# Patient Record
Sex: MALE | Race: WHITE | NOT HISPANIC OR LATINO | Employment: FULL TIME | URBAN - METROPOLITAN AREA
[De-identification: names, ages, dates, MRNs, and addresses within clinical notes are randomized per-mention and may not be internally consistent; named-entity substitution may affect disease eponyms.]

---

## 2017-02-15 ENCOUNTER — OFFICE VISIT (OUTPATIENT)
Dept: FAMILY MEDICINE | Facility: CLINIC | Age: 27
End: 2017-02-15
Payer: COMMERCIAL

## 2017-02-15 VITALS
SYSTOLIC BLOOD PRESSURE: 138 MMHG | OXYGEN SATURATION: 98 % | HEART RATE: 72 BPM | WEIGHT: 315 LBS | RESPIRATION RATE: 20 BRPM | DIASTOLIC BLOOD PRESSURE: 90 MMHG | TEMPERATURE: 97.6 F | BODY MASS INDEX: 40.62 KG/M2

## 2017-02-15 DIAGNOSIS — M79.5 FOREIGN BODY (FB) IN SOFT TISSUE: ICD-10-CM

## 2017-02-15 DIAGNOSIS — F17.200 TOBACCO USE DISORDER: Primary | ICD-10-CM

## 2017-02-15 PROCEDURE — 99214 OFFICE O/P EST MOD 30 MIN: CPT | Performed by: FAMILY MEDICINE

## 2017-02-15 RX ORDER — VARENICLINE TARTRATE 1 MG/1
1 TABLET, FILM COATED ORAL 2 TIMES DAILY
Qty: 56 TABLET | Refills: 2 | Status: SHIPPED | OUTPATIENT
Start: 2017-02-15 | End: 2017-05-01

## 2017-02-15 ASSESSMENT — PAIN SCALES - GENERAL: PAINLEVEL: SEVERE PAIN (7)

## 2017-02-15 NOTE — PROGRESS NOTES
SUBJECTIVE:                                                    Maco Chen is a 26 year old male who presents to clinic today for the following health issues:      Chief Complaint   Patient presents with     Injury     Has a sliver in his left palm, states that it has puss coming out of it.  States that it is painful and throbbs.       Healthy young man comes in with a potential sliver for the past week. Getting worse. Has purulent discharge. No fevers. Minimal redness.    We also had a good discussion about smoking as this patient cessation. He has been a pack per day. History of patches and gum. He would like to try Chantix next. After discussed the options.      Problem list and histories reviewed & adjusted, as indicated.  Additional history: as documented        ROS:      OBJECTIVE:                                                    /90 (BP Location: Left arm, Patient Position: Chair, Cuff Size: Adult Regular)  Pulse 72  Temp 97.6  F (36.4  C) (Temporal)  Resp 20  Wt (!) 316 lb 6.4 oz (143.5 kg)  SpO2 98%  BMI 40.62 kg/m2  Body mass index is 40.62 kg/(m^2).  Well-appearing. On his left hand on the palmar aspect near the MCP joint there is a puncture wound with a small amount of drainage present. I anesthetized the area with 3 mL's 1% lidocaine with epinephrine. With good anesthesia and then did a shallow stab incision. I removed a 1 cm splinter. Mild amount of bleeding. Sterile dressing applied.         ASSESSMENT/PLAN:                                                              ICD-10-CM    1. Tobacco use disorder F17.200 varenicline (CHANTIX STARTING MONTH BIANCA) 0.5 MG X 11 & 1 MG X 42 tablet     varenicline (CHANTIX) 1 MG tablet   2. Foreign body (FB) in soft tissue M79.5      Foreign body removed without difficulty. After discussion of his options, like to try Chantix. Discussed the risks benefits and possible side effects associated with medication use. Also discussed nonpharmacological  ways to address his dependence. See him back in one month for recheck. He agrees.      Immanuel Andrew MD  [facility]

## 2017-02-15 NOTE — MR AVS SNAPSHOT
"              After Visit Summary   2/15/2017    Maco Chen    MRN: [id number]           Patient Information     Date Of Birth          1990        Visit Information        Provider Department      2/15/2017 9:20 AM Immanuel Andrew MD [facility]        Today's Diagnoses     Tobacco use disorder    -  1    Foreign body (FB) in soft tissue           Follow-ups after your visit        Who to contact     If you have questions or need follow up information about today's clinic visit or your schedule please contact [facility] directly at [phone].  Normal or non-critical lab and imaging results will be communicated to you by [employer], letter or phone within 4 business days after the clinic has received the results. If you do not hear from us within 7 days, please contact the clinic through [employer] or phone. If you have a critical or abnormal lab result, we will notify you by phone as soon as possible.  Submit refill requests through [employer] or call your pharmacy and they will forward the refill request to us. Please allow 3 business days for your refill to be completed.          Additional Information About Your Visit        MyChart Information     [employer] lets you send messages to your doctor, view your test results, renew your prescriptions, schedule appointments and more. To sign up, go to [email or web address] . Click on \"Log in\" on the left side of the screen, which will take you to the Welcome page. Then click on \"Sign up Now\" on the right side of the page.     You will be asked to enter the access code listed below, as well as some personal information. Please follow the directions to create your username and password.     Your access code is: [email or web address]  Expires: 2017  5:13 PM     Your access code will  in 90 days. If you need help or a new code, please call your [facility] or [phone].        Care EveryWhere ID     This is your " Care EveryWhere ID. This could be used by other organizations to access your [facility] medical records  [id number]        Your Vitals Were     Pulse Temperature Respirations Pulse Oximetry BMI (Body Mass Index)       72 97.6  F (36.4  C) (Temporal) 20 98% 40.62 kg/m2        Blood Pressure from Last 3 Encounters:   02/15/17 138/90   11/04/16 130/80   10/03/16 (!) 165/109    Weight from Last 3 Encounters:   02/15/17 (!) 316 lb 6.4 oz (143.5 kg)   11/04/16 (!) 314 lb (142.4 kg)   10/03/16 290 lb (131.5 kg)              Today, you had the following     No orders found for display         Today's Medication Changes          These changes are accurate as of: 2/15/17  5:13 PM.  If you have any questions, ask your nurse or doctor.               Start taking these medicines.        Dose/Directions    * varenicline 0.5 MG X 11 & 1 MG X 42 tablet   Commonly known as:  CHANTIX STARTING MONTH BIANCA   Used for:  Tobacco use disorder   Started by:  Immanuel Andrew MD        Take 0.5 mg tab daily for 3 days, then 0.5 mg tab twice daily for 4 days, then 1 mg twice daily.   Quantity:  53 tablet   Refills:  0       * varenicline 1 MG tablet   Commonly known as:  CHANTIX   Used for:  Tobacco use disorder   Started by:  Immanuel Andrew MD        Dose:  1 mg   Take 1 tablet (1 mg) by mouth 2 times daily   Quantity:  56 tablet   Refills:  2       * Notice:  This list has 2 medication(s) that are the same as other medications prescribed for you. Read the directions carefully, and ask your doctor or other care provider to review them with you.         Where to get your medicines      These medications were sent to [facility] [place], [place] - [address]   [address] [facility] [address]     Phone:  [phone]     varenicline 0.5 MG X 11 & 1 MG X 42 tablet    varenicline 1 MG tablet                Primary Care Provider    None Doctor, MD       No address on file        Thank you!     Thank you for choosing  [facility]  for your care. Our goal is always to provide you with excellent care. Hearing back from our patients is one way we can continue to improve our services. Please take a few minutes to complete the written survey that you may receive in the mail after your visit with us. Thank you!             Your Updated Medication List - Protect others around you: Learn how to safely use, store and throw away your medicines at www.disposemymeds.org.          This list is accurate as of: 2/15/17  5:13 PM.  Always use your most recent med list.                   Brand Name Dispense Instructions for use    order for DME     1 Units    Equipment being ordered: right wrist splint of appropriate size       * varenicline 0.5 MG X 11 & 1 MG X 42 tablet    CHANTIX STARTING MONTH BIANCA    53 tablet    Take 0.5 mg tab daily for 3 days, then 0.5 mg tab twice daily for 4 days, then 1 mg twice daily.       * varenicline 1 MG tablet    CHANTIX    56 tablet    Take 1 tablet (1 mg) by mouth 2 times daily       * Notice:  This list has 2 medication(s) that are the same as other medications prescribed for you. Read the directions carefully, and ask your doctor or other care provider to review them with you.

## 2017-02-15 NOTE — NURSING NOTE
"Chief Complaint   Patient presents with     Injury     Has a sliver in his left palm, states that it has puss coming out of it.  States that it is painful and throbbs.         Initial /90 (BP Location: Left arm, Patient Position: Chair, Cuff Size: Adult Regular)  Pulse 72  Temp 97.6  F (36.4  C) (Temporal)  Resp 20  Wt (!) 316 lb 6.4 oz (143.5 kg)  SpO2 98%  BMI 40.62 kg/m2 Estimated body mass index is 40.62 kg/(m^2) as calculated from the following:    Height as of 5/23/16: 6' 2\" (1.88 m).    Weight as of this encounter: 316 lb 6.4 oz (143.5 kg).  Medication Reconciliation: complete   Roxanne Barnes, CMA     "

## 2017-05-01 ENCOUNTER — OFFICE VISIT (OUTPATIENT)
Dept: FAMILY MEDICINE | Facility: CLINIC | Age: 27
End: 2017-05-01
Payer: COMMERCIAL

## 2017-05-01 VITALS
WEIGHT: 315 LBS | TEMPERATURE: 98.1 F | SYSTOLIC BLOOD PRESSURE: 134 MMHG | OXYGEN SATURATION: 95 % | BODY MASS INDEX: 40.96 KG/M2 | RESPIRATION RATE: 16 BRPM | HEART RATE: 103 BPM | DIASTOLIC BLOOD PRESSURE: 74 MMHG

## 2017-05-01 DIAGNOSIS — J06.9 UPPER RESPIRATORY TRACT INFECTION, UNSPECIFIED TYPE: Primary | ICD-10-CM

## 2017-05-01 LAB
DEPRECATED S PYO AG THROAT QL EIA: NORMAL
FLUAV+FLUBV AG SPEC QL: NEGATIVE
FLUAV+FLUBV AG SPEC QL: NORMAL
MICRO REPORT STATUS: NORMAL
SPECIMEN SOURCE: NORMAL
SPECIMEN SOURCE: NORMAL

## 2017-05-01 PROCEDURE — 87804 INFLUENZA ASSAY W/OPTIC: CPT | Performed by: FAMILY MEDICINE

## 2017-05-01 PROCEDURE — 99213 OFFICE O/P EST LOW 20 MIN: CPT | Performed by: FAMILY MEDICINE

## 2017-05-01 PROCEDURE — 87081 CULTURE SCREEN ONLY: CPT | Performed by: FAMILY MEDICINE

## 2017-05-01 PROCEDURE — 87880 STREP A ASSAY W/OPTIC: CPT | Performed by: FAMILY MEDICINE

## 2017-05-01 RX ORDER — CETIRIZINE HYDROCHLORIDE 10 MG/1
10 TABLET ORAL EVERY EVENING
Qty: 30 TABLET | Refills: 1 | Status: SHIPPED | OUTPATIENT
Start: 2017-05-01 | End: 2017-07-10

## 2017-05-01 RX ORDER — FLUTICASONE PROPIONATE 50 MCG
2 SPRAY, SUSPENSION (ML) NASAL DAILY
Qty: 1 G | Refills: 6 | Status: SHIPPED | OUTPATIENT
Start: 2017-05-01 | End: 2017-07-10

## 2017-05-01 ASSESSMENT — PAIN SCALES - GENERAL: PAINLEVEL: MODERATE PAIN (5)

## 2017-05-01 NOTE — NURSING NOTE
"Chief Complaint   Patient presents with     Sinus Problem     1 week now     Cough       Initial /74  Pulse 103  Temp 98.1  F (36.7  C) (Tympanic)  Resp 16  Wt (!) 319 lb (144.7 kg)  SpO2 95%  BMI 40.96 kg/m2 Estimated body mass index is 40.96 kg/(m^2) as calculated from the following:    Height as of 5/23/16: 6' 2\" (1.88 m).    Weight as of this encounter: 319 lb (144.7 kg).  Medication Reconciliation: complete    "

## 2017-05-01 NOTE — PROGRESS NOTES
SUBJECTIVE:                                                    Maco Chen is a 26 year old male who presents to clinic today for the following health issues:      Acute Illness   Acute illness concerns:   Onset: 1 week now    Fever: no    Chills/Sweats: no     Headache (location?): YES    Sinus Pressure:YES    Conjunctivitis:  no    Ear Pain: YES: left    Rhinorrhea: YES    Congestion: YES    Sore Throat: yes - started this am      Cough: YES-productive of green sputum    Wheeze: no     Decreased Appetite: no     Nausea: no    Vomiting: no    Diarrhea:  no    Dysuria/Freq.: no    Fatigue/Achiness: YES    Sick/Strep Exposure: no     Therapies Tried and outcome: Rest, Sudafed    No fever.  HA behind the eyes to ears.  No sick exposure.  No exposure to measles.  No rash.  Couple days for watery eyes and mattering in the morning.  Been sneezing.  No neck stiffness. Drinking ok, low appetite.  No N/V.  Healthy - received two doses of MMR.        Problem list and histories reviewed & adjusted, as indicated.  Additional history: as documented    No current outpatient prescriptions on file.     Allergies   Allergen Reactions     Tessalon [Benzonatate] Itching, Swelling and Rash     Red flushed face, hives to face, ears and jaw area very swollen       Zithromax [Azithromycin Dihydrate] Itching, Swelling and Rash     Red flushed face, hives to face, ears and jaw area very swollen       Reviewed and updated as needed this visit by clinical staff  Tobacco  Meds       Reviewed and updated as needed this visit by Provider         ROS:  Constitutional, HEENT, cardiovascular, pulmonary, gi and gu systems are negative, except as otherwise noted.    OBJECTIVE:                                                    /74  Pulse 103  Temp 98.1  F (36.7  C) (Tympanic)  Resp 16  Wt (!) 319 lb (144.7 kg)  SpO2 95%  BMI 40.96 kg/m2  Body mass index is 40.96 kg/(m^2).   GENERAL: healthy, alert and no distress.  Speak  in full sentences.  Not looking acutely sick  HENT: ear canals and TM's normal.  Nares are congested with clear drainage.  Oropharynx is pink and moist.  No tonsilar redness or hypertrophy but white exudate was noted on the uvula.  No tender with palpation to the sinuses.  Eyes with no conjunctivital injection or erythema.  No mattering.   NECK: no adenopathy.  RESP: lungs clear to auscultation - no rales, rhonchi or wheezes  CV: regular rate and rhythm, no murmur.  ABDOMEN: soft, non-tender and bowel sounds normal    Diagnostic Test Results:  Results for orders placed or performed in visit on 05/01/17 (from the past 24 hour(s))   Strep, Rapid Screen   Result Value Ref Range    Specimen Description Throat     Rapid Strep A Screen       NEGATIVE: No Group A streptococcal antigen detected by immunoassay, await   culture report.      Micro Report Status FINAL 05/01/2017    Influenza A/B antigen   Result Value Ref Range    Influenza A/B Agn Specimen Nasopharyngeal     Influenza A Negative NEG    Influenza B  NEG     Negative   Test results must be correlated with clinical data. If necessary, results   should be confirmed by a molecular assay or viral culture.          ASSESSMENT/PLAN:                                                        ICD-10-CM    1. Upper respiratory tract infection, unspecified type J06.9 Strep, Rapid Screen     Influenza A/B antigen     Beta strep group A culture     cetirizine (ZYRTEC) 10 MG tablet     fluticasone (FLONASE) 50 MCG/ACT spray     CANCELED: Influenza A and B and RSV PCR     Discussed with him about the nature of the condition.  Inform him that it is most likely is viral in nature and therefore antibiotic would not be effective.  Encourage OTC medications as needed for symptomatic treatment.  Zyrtec and Flonase for nasal congestion.  Recommend to drink a lot of water and rest adequately.  Call in or follow up if not improve or worsening in the next 3-4 days.  ER if develops  breathing problem.      Lena Chung Mai, MD  [facility]

## 2017-05-01 NOTE — MR AVS SNAPSHOT
"              After Visit Summary   2017    Maco Chen    MRN: [id number]           Patient Information     Date Of Birth          1990        Visit Information        Provider Department      2017 2:00 PM Lena Coleman MD [facility]        Today's Diagnoses     Upper respiratory tract infection, unspecified type    -  1       Follow-ups after your visit        Follow-up notes from your care team     Return if symptoms worsen or fail to improve.      Who to contact     If you have questions or need follow up information about today's clinic visit or your schedule please contact [facility] directly at [phone].  Normal or non-critical lab and imaging results will be communicated to you by MyChart, letter or phone within 4 business days after the clinic has received the results. If you do not hear from us within 7 days, please contact the clinic through MyChart or phone. If you have a critical or abnormal lab result, we will notify you by phone as soon as possible.  Submit refill requests through [employer] or call your pharmacy and they will forward the refill request to us. Please allow 3 business days for your refill to be completed.          Additional Information About Your Visit        MyChart Information     [employer] lets you send messages to your doctor, view your test results, renew your prescriptions, schedule appointments and more. To sign up, go to [email or web address] . Click on \"Log in\" on the left side of the screen, which will take you to the Welcome page. Then click on \"Sign up Now\" on the right side of the page.     You will be asked to enter the access code listed below, as well as some personal information. Please follow the directions to create your username and password.     Your access code is: [email or web address]  Expires: 2017  6:13 PM     Your access code will  in 90 days. If you need help or a new code, please call your " [facility] or [phone].        Care EveryWhere ID     This is your Care EveryWhere ID. This could be used by other organizations to access your [place] medical records  [id number]        Your Vitals Were     Pulse Temperature Respirations Pulse Oximetry BMI (Body Mass Index)       103 98.1  F (36.7  C) (Tympanic) 16 95% 40.96 kg/m2        Blood Pressure from Last 3 Encounters:   05/01/17 134/74   02/15/17 138/90   11/04/16 130/80    Weight from Last 3 Encounters:   05/01/17 (!) 319 lb (144.7 kg)   02/15/17 (!) 316 lb 6.4 oz (143.5 kg)   11/04/16 (!) 314 lb (142.4 kg)              We Performed the Following     Beta strep group A culture     Influenza A/B antigen     Strep, Rapid Screen          Today's Medication Changes          These changes are accurate as of: 5/1/17  4:32 PM.  If you have any questions, ask your nurse or doctor.               Start taking these medicines.        Dose/Directions    cetirizine 10 MG tablet   Commonly known as:  zyrTEC   Used for:  Upper respiratory tract infection, unspecified type   Started by:  Lena Coleman MD        Dose:  10 mg   Take 1 tablet (10 mg) by mouth every evening   Quantity:  30 tablet   Refills:  1       fluticasone 50 MCG/ACT spray   Commonly known as:  FLONASE   Used for:  Upper respiratory tract infection, unspecified type   Started by:  Lena Coleman MD        Dose:  2 spray   Spray 2 sprays into both nostrils daily   Quantity:  1 g   Refills:  6            Where to get your medicines      These medications were sent to [facility] [place], [place] - [address]   [address] [facility] [address]     Phone:  [phone]     cetirizine 10 MG tablet    fluticasone 50 MCG/ACT spray                Primary Care Provider    None Doctor, MD       No address on file        Thank you!     Thank you for choosing [facility]  for your care. Our goal is always to provide you with excellent care. Hearing back from our patients is one  way we can continue to improve our services. Please take a few minutes to complete the written survey that you may receive in the mail after your visit with us. Thank you!             Your Updated Medication List - Protect others around you: Learn how to safely use, store and throw away your medicines at www.disposemymeds.org.          This list is accurate as of: 5/1/17  4:32 PM.  Always use your most recent med list.                   Brand Name Dispense Instructions for use    cetirizine 10 MG tablet    zyrTEC    30 tablet    Take 1 tablet (10 mg) by mouth every evening       fluticasone 50 MCG/ACT spray    FLONASE    1 g    Spray 2 sprays into both nostrils daily

## 2017-05-01 NOTE — LETTER
[address]   [address]  Tel. [phone] / Fax [phone]          Regarding:  Simonchhaya SAMANTA Chen  [address] [place] [facility] [address]        5/1/2017      To whom it may concern;    Maco was seen on clinic today. He needs to be out of work from 5/1/2017-5/3/2017. If you have any questions or concerns, Please call my office at [phone].        Sincerely,

## 2017-05-02 ENCOUNTER — TELEPHONE (OUTPATIENT)
Dept: FAMILY MEDICINE | Facility: CLINIC | Age: 27
End: 2017-05-02

## 2017-05-02 NOTE — TELEPHONE ENCOUNTER
Patient called back and info was given.  Thank you,  Annamaria Aden   for [facility]

## 2017-05-02 NOTE — TELEPHONE ENCOUNTER
----- Message from Lena Chung Mai, MD sent at 5/1/2017  5:59 PM CDT -----  Please let patient know that his test for influenza were also negative.

## 2017-05-03 LAB
BACTERIA SPEC CULT: NORMAL
MICRO REPORT STATUS: NORMAL
SPECIMEN SOURCE: NORMAL

## 2017-07-10 ENCOUNTER — APPOINTMENT (OUTPATIENT)
Dept: GENERAL RADIOLOGY | Facility: CLINIC | Age: 27
End: 2017-07-10
Attending: PHYSICIAN ASSISTANT
Payer: COMMERCIAL

## 2017-07-10 ENCOUNTER — HOSPITAL ENCOUNTER (EMERGENCY)
Facility: CLINIC | Age: 27
Discharge: HOME | End: 2017-07-10
Attending: PHYSICIAN ASSISTANT | Admitting: PHYSICIAN ASSISTANT
Payer: COMMERCIAL

## 2017-07-10 VITALS
HEIGHT: 74 IN | BODY MASS INDEX: 40.43 KG/M2 | RESPIRATION RATE: 16 BRPM | HEART RATE: 76 BPM | TEMPERATURE: 98 F | WEIGHT: 315 LBS | SYSTOLIC BLOOD PRESSURE: 159 MMHG | OXYGEN SATURATION: 99 % | DIASTOLIC BLOOD PRESSURE: 96 MMHG
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
## 2017-07-10 DIAGNOSIS — S61.312A LACERATION OF RIGHT MIDDLE FINGER WITHOUT FOREIGN BODY WITH DAMAGE TO NAIL, INITIAL ENCOUNTER: ICD-10-CM

## 2017-07-10 DIAGNOSIS — S60.131A CONTUSION OF RIGHT MIDDLE FINGER WITH DAMAGE TO NAIL, INITIAL ENCOUNTER: ICD-10-CM

## 2017-07-10 PROCEDURE — 12002 RPR S/N/AX/GEN/TRNK2.6-7.5CM: CPT | Mod: Z6 | Performed by: PHYSICIAN ASSISTANT

## 2017-07-10 PROCEDURE — 12002 RPR S/N/AX/GEN/TRNK2.6-7.5CM: CPT | Performed by: PHYSICIAN ASSISTANT

## 2017-07-10 PROCEDURE — 73140 X-RAY EXAM OF FINGER(S): CPT | Mod: TC,RT

## 2017-07-10 PROCEDURE — 99282 EMERGENCY DEPT VISIT SF MDM: CPT | Mod: Z6 | Performed by: PHYSICIAN ASSISTANT

## 2017-07-10 PROCEDURE — 99283 EMERGENCY DEPT VISIT LOW MDM: CPT | Performed by: PHYSICIAN ASSISTANT

## 2017-07-10 RX ORDER — BUPIVACAINE HYDROCHLORIDE 2.5 MG/ML
10 INJECTION, SOLUTION EPIDURAL; INFILTRATION; INTRACAUDAL ONCE
Status: DISCONTINUED | OUTPATIENT
Start: 2017-07-10 | End: 2017-07-10 | Stop reason: HOSPADM

## 2017-07-10 NOTE — ED PROVIDER NOTES
"  History     Chief Complaint   Patient presents with     Trauma     Hand Pain     The history is provided by the patient.     Maco Chen is a 26 year old male who presents to the ED with complaints of right hand pain. The patient states that he dropped a 400 pound cabinet on his middle finger. He says that it did not begin to bleed until he started to wrap it up. He endorses that it feels like his finger is going to \"explode\". He reports having his last tetanus shot in 2014.    I have reviewed the Medications, Allergies, Past Medical and Surgical History, and Social History in the Epic system.    Patient Active Problem List   Diagnosis     Morbid obesity with body mass index of 40.0-44.9 in adult (H)     Disturbance in sleep behavior     Hypertrophy of tonsils alone     Oppositional defiant disorder     Tobacco use disorder     Knee Pain, effusion, right, trauma     Past Medical History:   Diagnosis Date     Hypertrophy of tonsils alone      Obesity, unspecified      Oppositional defiant disorder of childhood or adolescence      Sleep disturbance, unspecified      Past Surgical History:   Procedure Laterality Date     HC REMOVAL PREPATELLA BURSA  05/27/10     TONSILLECTOMY & ADENOIDECTOMY  3/21/2006     Family History   Problem Relation Age of Onset     CANCER Maternal Grandfather      throat cancer     Allergies Brother      Allergies Sister      Social History   Substance Use Topics     Smoking status: Current Every Day Smoker     Packs/day: 1.00     Years: 5.00     Types: Cigarettes     Smokeless tobacco: Never Used     Alcohol use 0.0 oz/week     0 Standard drinks or equivalent per week      Comment: rare      Immunization History   Administered Date(s) Administered     DPT 03/24/1993, 08/27/1993, 10/22/1993, 02/08/1994, 04/26/1994, 09/08/1998     HepB-Peds 07/29/1998, 08/31/2005, 12/05/2005     Influenza Vaccine IM 3yrs+ 4 Valent IIV4 09/09/2016     MMR 04/26/1994, 07/29/1998     OPV 03/24/1993, " "08/27/1993, 10/22/1993, 04/26/1994     TD (ADULT, 7+) 08/31/2005, 04/21/2014     Allergies   Allergen Reactions     Tessalon [Benzonatate] Itching, Swelling and Rash     Red flushed face, hives to face, ears and jaw area very swollen       Zithromax [Azithromycin Dihydrate] Itching, Swelling and Rash     Red flushed face, hives to face, ears and jaw area very swollen     No current outpatient prescriptions on file.     Review of Systems   Musculoskeletal:        Right middle finger pain    All other systems reviewed and are negative.    Physical Exam   BP: (!) 159/96  Pulse: 80  Temp: 98  F (36.7  C)  Resp: 18  Height: 188 cm (6' 2\")  Weight: (!) 145.2 kg (320 lb)  SpO2: 99 %  Physical Exam   Constitutional: He is oriented to person, place, and time. He appears well-developed and well-nourished.   HENT:   Head: Atraumatic.   Eyes: Conjunctivae and EOM are normal.   Neck: Normal range of motion. Neck supple.   Cardiovascular: Normal rate, regular rhythm and normal heart sounds.    Pulmonary/Chest: Effort normal and breath sounds normal.   Abdominal: Soft. Bowel sounds are normal.   Musculoskeletal:        Right hand: He exhibits tenderness and laceration. He exhibits normal range of motion.        Hands:  Right third finger with 2 lacerations. The ventral aspect of the distal phalanx has a 2 cm distal based flap laceration transversely across the fingers. Normal flexion and extension at the DIP. Normal strength. No tendon involvement. The tip of the finger along the distal nailbed there is a laceration measuring 2 cm. The skin in this location  from the distal nail plate area. It will require suturing to reapproximate. Sensation is intact to light touch.   Neurological: He is alert and oriented to person, place, and time.   Skin: Skin is warm and dry.   Psychiatric: He has a normal mood and affect. His behavior is normal.   Nursing note and vitals reviewed.    ED Course     ED Course     Laceration " "repair  Date/Time: 7/11/2017 2:42 PM  Performed by: DAMI MILLER  Authorized by: DAMI MILLER   Consent: Verbal consent obtained.  Risks and benefits: risks, benefits and alternatives were discussed  Consent given by: patient  Patient understanding: patient states understanding of the procedure being performed  Patient identity confirmed: verbally with patient and arm band  Time out: Immediately prior to procedure a \"time out\" was called to verify the correct patient, procedure, equipment, support staff and site/side marked as required.  Body area: upper extremity  Location details: right long finger  Laceration length: 2 (2 lacerations, each 2 cm long) cm  Foreign bodies: no foreign bodies  Tendon involvement: none  Nerve involvement: none  Vascular damage: no  Anesthesia: digital block    Anesthesia:  Anesthesia: digital block  Local Anesthetic: bupivacaine 0.25% without epinephrine   Anesthetic total: 6 mL  Irrigation solution: saline  Irrigation method: syringe  Amount of cleaning: standard  Debridement: none  Degree of undermining: none  Skin closure: 4-0 nylon  Number of sutures: 7 (3 on the distal finger laceration, 4 on the ventral distal finger laceration)  Technique: simple  Approximation: close  Approximation difficulty: simple  Dressing: antibiotic ointment and tube gauze  Patient tolerance: Patient tolerated the procedure well with no immediate complications                   Critical Care time:  none        Results for orders placed or performed during the hospital encounter of 07/10/17   XR Finger Right G/E 2 Views    Narrative    RIGHT FINGER TWO OR MORE VIEWS   7/10/2017 5:45 PM     HISTORY: Right distal middle finger injury.    COMPARISON: None.      Impression    IMPRESSION: No acute fracture or fracture or dislocation. Soft tissue  swelling adjacent to the tip of the right third digit.    RISA EUCEDA MD        Results for orders placed or performed during the hospital encounter of " 07/10/17 (from the past 24 hour(s))   XR Finger Right G/E 2 Views    Narrative    RIGHT FINGER TWO OR MORE VIEWS   7/10/2017 5:45 PM     HISTORY: Right distal middle finger injury.    COMPARISON: None.      Impression    IMPRESSION: No acute fracture or fracture or dislocation. Soft tissue  swelling adjacent to the tip of the right third digit.    RISA EUCEDA MD       Medications - No data to display  Assessments & Plan (with Medical Decision Making)     Laceration of right middle finger without foreign body with damage to nail, initial encounter  Contusion of right middle finger with damage to nail, initial encounter     26 year old male presents for evaluation of a right third distal finger injury. He was lifting a 400 pound cabinet when it fell directly onto his finger. He pulled his finger away when it landed on it. He noted bleeding right away and therefore he bandaged it and came into the ED for evaluation. She denies any change in his strength or range of motion ability. Sensation is intact to light touch. Tetanus status is up-to-date as it was given last in the previous 3 years. On exam he has a laceration on the ventral and distal aspect of the finger, both 2 cm in length. They both required suturing. 4 anesthesia we did provide a digital block. X-ray of the finger did not show any underlying fracture. 3 simple interrupted 4-0 Ethilon sutures were placed through the distal nail plate on the distal finger laceration to approximate it back. I then placed #4 Ethilon 4-0 sutures on the ventral aspect of the finger to approximate the flap laceration. The wound was dressed in bacitracin ointment and then tube gauze. He was provided in aluminum padded splint to protect the finger. We discussed elevating the finger as much as possible. Ibuprofen as needed. Change the wound dressing 2 times per day. Follow-up for possible suture removal in 1 week. The patient was in agreement with this plan and was suitable for  discharge.      I have reviewed the nursing notes.    I have reviewed the findings, diagnosis, plan and need for follow up with the patient.       There are no discharge medications for this patient.      Final diagnoses:   Laceration of right middle finger without foreign body with damage to nail, initial encounter   Contusion of right middle finger with damage to nail, initial encounter     This document serves as a record of services personally performed by Angel Franco PA-C. It was created on their behalf by Nicole Bowen, a trained medical scribe. The creation of this record is based on the provider's personal observations and the statements of the patient. This document has been checked and approved by the attending provider.    Note: Chart documentation done in part with Dragon Voice Recognition software. Although reviewed after completion, some word and grammatical errors may remain.    7/10/2017   Angel Franco PA-C   [facility] EMERGENCY DEPARTMENT     Angel Franco PA-C  07/11/17 1441       Angel Franco PA-C  07/11/17 1444

## 2017-07-10 NOTE — ED AVS SNAPSHOT
[facility]    [address] [place] [address]    Phone:  [phone]    Fax:  [phone]                                       Maco Chen   MRN: [id number]    Department:  [facility] Department   Date of Visit:  7/10/2017           Patient Information     Date Of Birth          1990        Your diagnoses for this visit were:     Laceration of right middle finger without foreign body with damage to nail, initial encounter     Contusion of right middle finger with damage to nail, initial encounter        You were seen by Angel Franco PA-C.      Follow-up Information     Follow up with Clinic, [facility]. Schedule an appointment as soon as possible for a visit in 1 week.    Why:  For suture removal    Contact information:    [phone]          Discharge Instructions       1.   Please change your finger dressing 2 times per day.  Use Bacitracin, gauze, and conforming bandage.    2.   Please keep the finger elevated as much as possible to help prevent pain and swelling.    3.   It is okay to use Ibuprofen 800 mg ( 4 tabs of the over the counter 200 mg tablets ) every 8 hours as needed for pain.          Extremity Laceration: Sutures, Staples, or Tape  A laceration is a cut through the skin. If it is deep, it may require stitches (sutures) or staples to close so it can heal. Minor cuts may be treated with surgical tape closures.   X-rays may be done if something may have entered the skin through the cut. You may also need a tetanus shot if you are not up to date on this vaccination.  Home care    Follow the health care provider s instructions on how to care for the cut.    Wash your hands with soap and warm water before and after caring for your wound. This is to help prevent infection.    Keep the wound clean and dry. If a bandage was applied and it becomes wet or dirty, replace it. Otherwise, leave it in place  for the first 24 hours, then change it once a day or as directed.    If sutures or staples were used, clean the wound daily:    After removing the bandage, wash the area with soap and water. Use a wet cotton swab to loosen and remove any blood or crust that forms.    After cleaning, keep the wound clean and dry. Talk with your doctor before applying any antibiotic ointment to the wound. Reapply the bandage.    You may remove the bandage to shower as usual after the first 24 hours, but do not soak the area in water (no swimming) until the stitches or staples are removed.    If surgical tape closures were used, keep the area clean and dry. If it becomes wet, blot it dry with a towel.    The doctor may prescribe an antibiotic cream or ointment to prevent infection. Do not stop taking this medication until you have finished the prescribed course or the doctor tells you to stop. The doctor may also prescribe medications for pain. Follow the doctor s instructions for taking these medications.    Avoid activities that may reopen your wound.  Follow-up care  Follow up with your health care provider. Most skin wounds heal within ten days. However, an infection may sometimes occur despite proper treatment. Therefore, check the wound daily for the signs of infection listed below. Stitches and staples should be removed within 7-14 days. If surgical tape closures were used, you may remove them after 10 days if they have not fallen off by then.   When to seek medical advice  Call your health care provider right away if any of these occur:    Wound bleeding not controlled by direct pressure    Signs of infection, including increasing pain in the wound, increasing wound redness or swelling, or pus or bad odor coming from the wound    Fever of 100.4 F (38 C) or higher or as directed by your healthcare provider    Stitches or staples come apart or fall out or surgical tape falls off before 7 days    Wound edges re-open    Wound  changes colors    Numbness around the wound     Decreased movement around the injured area  Date Last Reviewed: 6/14/2015 2000-2017 [employer] [address] [place] [address], [place] [address]. All rights reserved. This information is not intended as a substitute for professional medical care. Always follow your healthcare professional's instructions.          Finger Contusion  You have a contusion. This is also called a bruise. There is swelling and some bleeding under the skin, but no broken bones. This injury generally takes a few days to a few weeks to heal. During that time, the bruise will typically change in color from reddish, to purple-blue, to greenish-yellow, then to yellow-brown.  A finger contusion may be treated with a splint or jamison tape (taping the injured finger to the one next to it for support). Minor contusions likely will need no other treatment.  Home care    Elevate the hand to reduce pain and swelling. As much as possible, sit or lie down with the hand raised about the level of your heart. This is especially important during the first 48 hours.    Ice the finger to help reduce pain and swelling. Wrap a cold source (ice pack or ice cubes in a plastic bag) in a thin towel. Apply to the bruised finger for 20 minutes every 1 to 2 hours the first day. Continue this 3 to 4 times a day until the pain and swelling goes away.    If jamison tape was applied and it becomes wet or dirty, change it. You may replace it with paper, plastic, or cloth tape. Before taping, put a thin strip of cotton or gauze between the fingers to absorb sweat.    Unless another medication was prescribed, you can take acetaminophen, ibuprofen, or naproxen to control pain. (If you have chronic liver or kidney disease or ever had a stomach ulcer or GI bleeding, talk with your doctor before using these medicines.)  Follow up  Follow up with your healthcare provider or our staff as advised. Call if you are not improving  within 1 to 2 weeks.  When to seek medical advice   Call your healthcare provider right away if you have any of the following:    Increased pain or swelling    Hand or arm becomes cold, blue, numb or tingly    Signs of infection: Warmth, drainage, or increased redness or pain around the bruise    Inability to move the injured finger or hand     Frequent bruising for unknown reasons  Date Last Reviewed: 4/29/2015 2000-2017 [employer]. [address] [place] [address], [place] [address]. All rights reserved. This information is not intended as a substitute for professional medical care. Always follow your healthcare professional's instructions.          24 Hour Appointment Hotline       To make an appointment at any [facility] clinic, call [phone] ([phone]). If you don't have a family doctor or clinic, we will help you find one. [facility] clinics are conveniently located to serve the needs of you and your family.             Review of your medicines      Notice     You have not been prescribed any medications.            Procedures and tests performed during your visit     XR Finger Right G/E 2 Views      Orders Needing Specimen Collection     None      Pending Results     Date and Time Order Name Status Description    7/10/2017 1726 XR Finger Right G/E 2 Views Preliminary             Pending Culture Results     No orders found from 7/8/2017 to 7/11/2017.            Pending Results Instructions     If you had any lab results that were not finalized at the time of your Discharge, you can call the ED Lab Result RN at [phone]. You will be contacted by this team for any positive Lab results or changes in treatment. The nurses are available 7 days a week from 10A to 6:30P.  You can leave a message 24 hours per day and they will return your call.        Thank you for choosing [facility]       Thank you for choosing [facility] for your care. Our goal is always to provide you with excellent care. Hearing  "back from our patients is one way we can continue to improve our services. Please take a few minutes to complete the written survey that you may receive in the mail after you visit with us. Thank you!        [employer] App Information     [employer] lets you send messages to your doctor, view your test results, renew your prescriptions, schedule appointments and more. To sign up, go to [email or web address] . Click on \"Log in\" on the left side of the screen, which will take you to the Welcome page. Then click on \"Sign up Now\" on the right side of the page.     You will be asked to enter the access code listed below, as well as some personal information. Please follow the directions to create your username and password.     Your access code is: [email or web address]  Expires: 10/8/2017  6:54 PM     Your access code will  in 90 days. If you need help or a new code, please call your [facility] clinic or [phone].        Care EveryWhere ID     This is your Care EveryWhere ID. This could be used by other organizations to access your [facility] medical records  [id number]        Equal Access to Services     RADHA JO : Hadsherrill Gardner, joyce vance, bharath patrick, alison moran . So [facility] [phone].    ATENCIÓN: Si habla español, tiene a tavares disposición servicios gratuitos de asistencia lingüística. Clara al [phone].    We comply with applicable federal civil rights laws and [place] laws. We do not discriminate on the basis of race, color, national origin, age, disability sex, sexual orientation or gender identity.            After Visit Summary       This is your record. Keep this with you and show to your community pharmacist(s) and doctor(s) at your next visit.                  "

## 2017-07-10 NOTE — ED AVS SNAPSHOT
[facility]    [address] [place] [address]    Phone:  [phone]    Fax:  [phone]                                       Maco Chen   MRN: [id number]    Department:  [facility]   Date of Visit:  7/10/2017           After Visit Summary Signature Page     I have received my discharge instructions, and my questions have been answered. I have discussed any challenges I see with this plan with the nurse or doctor.    ..........................................................................................................................................  Patient/Patient Representative Signature      ..........................................................................................................................................  Patient Representative Print Name and Relationship to Patient    ..................................................               ................................................  Date                                            Time    ..........................................................................................................................................  Reviewed by Signature/Title    ...................................................              ..............................................  Date                                                            Time

## 2017-07-10 NOTE — DISCHARGE INSTRUCTIONS
1.   Please change your finger dressing 2 times per day.  Use Bacitracin, gauze, and conforming bandage.    2.   Please keep the finger elevated as much as possible to help prevent pain and swelling.    3.   It is okay to use Ibuprofen 800 mg ( 4 tabs of the over the counter 200 mg tablets ) every 8 hours as needed for pain.          Extremity Laceration: Sutures, Staples, or Tape  A laceration is a cut through the skin. If it is deep, it may require stitches (sutures) or staples to close so it can heal. Minor cuts may be treated with surgical tape closures.   X-rays may be done if something may have entered the skin through the cut. You may also need a tetanus shot if you are not up to date on this vaccination.  Home care    Follow the health care provider s instructions on how to care for the cut.    Wash your hands with soap and warm water before and after caring for your wound. This is to help prevent infection.    Keep the wound clean and dry. If a bandage was applied and it becomes wet or dirty, replace it. Otherwise, leave it in place for the first 24 hours, then change it once a day or as directed.    If sutures or staples were used, clean the wound daily:    After removing the bandage, wash the area with soap and water. Use a wet cotton swab to loosen and remove any blood or crust that forms.    After cleaning, keep the wound clean and dry. Talk with your doctor before applying any antibiotic ointment to the wound. Reapply the bandage.    You may remove the bandage to shower as usual after the first 24 hours, but do not soak the area in water (no swimming) until the stitches or staples are removed.    If surgical tape closures were used, keep the area clean and dry. If it becomes wet, blot it dry with a towel.    The doctor may prescribe an antibiotic cream or ointment to prevent infection. Do not stop taking this medication until you have finished the prescribed course or the doctor tells you to stop. The  doctor may also prescribe medications for pain. Follow the doctor s instructions for taking these medications.    Avoid activities that may reopen your wound.  Follow-up care  Follow up with your health care provider. Most skin wounds heal within ten days. However, an infection may sometimes occur despite proper treatment. Therefore, check the wound daily for the signs of infection listed below. Stitches and staples should be removed within 7-14 days. If surgical tape closures were used, you may remove them after 10 days if they have not fallen off by then.   When to seek medical advice  Call your health care provider right away if any of these occur:    Wound bleeding not controlled by direct pressure    Signs of infection, including increasing pain in the wound, increasing wound redness or swelling, or pus or bad odor coming from the wound    Fever of 100.4 F (38 C) or higher or as directed by your healthcare provider    Stitches or staples come apart or fall out or surgical tape falls off before 7 days    Wound edges re-open    Wound changes colors    Numbness around the wound     Decreased movement around the injured area  Date Last Reviewed: 6/14/2015 2000-2017 [employer]. [address] [place] [address]. All rights reserved. This information is not intended as a substitute for professional medical care. Always follow your healthcare professional's instructions.          Finger Contusion  You have a contusion. This is also called a bruise. There is swelling and some bleeding under the skin, but no broken bones. This injury generally takes a few days to a few weeks to heal. During that time, the bruise will typically change in color from reddish, to purple-blue, to greenish-yellow, then to yellow-brown.  A finger contusion may be treated with a splint or jamison tape (taping the injured finger to the one next to it for support). Minor contusions likely will need no other  treatment.  Home care    Elevate the hand to reduce pain and swelling. As much as possible, sit or lie down with the hand raised about the level of your heart. This is especially important during the first 48 hours.    Ice the finger to help reduce pain and swelling. Wrap a cold source (ice pack or ice cubes in a plastic bag) in a thin towel. Apply to the bruised finger for 20 minutes every 1 to 2 hours the first day. Continue this 3 to 4 times a day until the pain and swelling goes away.    If jamison tape was applied and it becomes wet or dirty, change it. You may replace it with paper, plastic, or cloth tape. Before taping, put a thin strip of cotton or gauze between the fingers to absorb sweat.    Unless another medication was prescribed, you can take acetaminophen, ibuprofen, or naproxen to control pain. (If you have chronic liver or kidney disease or ever had a stomach ulcer or GI bleeding, talk with your doctor before using these medicines.)  Follow up  Follow up with your healthcare provider or our staff as advised. Call if you are not improving within 1 to 2 weeks.  When to seek medical advice   Call your healthcare provider right away if you have any of the following:    Increased pain or swelling    Hand or arm becomes cold, blue, numb or tingly    Signs of infection: Warmth, drainage, or increased redness or pain around the bruise    Inability to move the injured finger or hand     Frequent bruising for unknown reasons  Date Last Reviewed: 4/29/2015 2000-2017 [employer]. [address] [place] [address], [place] [address]. All rights reserved. This information is not intended as a substitute for professional medical care. Always follow your healthcare professional's instructions.

## 2017-07-10 NOTE — ED NOTES
Patient presents with R) 3rd finger injury. Patient reports dropping a cabinet on it about 45 minutes PTA. Tammy Ahumada RN

## 2018-02-27 ENCOUNTER — OFFICE VISIT (OUTPATIENT)
Dept: FAMILY MEDICINE | Facility: OTHER | Age: 28
End: 2018-02-27
Payer: COMMERCIAL

## 2018-02-27 VITALS
HEART RATE: 80 BPM | BODY MASS INDEX: 39.78 KG/M2 | SYSTOLIC BLOOD PRESSURE: 138 MMHG | RESPIRATION RATE: 20 BRPM | TEMPERATURE: 98.2 F | HEIGHT: 72 IN | WEIGHT: 293.7 LBS | DIASTOLIC BLOOD PRESSURE: 80 MMHG | OXYGEN SATURATION: 95 %

## 2018-02-27 DIAGNOSIS — J01.90 ACUTE SINUSITIS WITH SYMPTOMS > 10 DAYS: Primary | ICD-10-CM

## 2018-02-27 DIAGNOSIS — F17.200 TOBACCO USE DISORDER: ICD-10-CM

## 2018-02-27 DIAGNOSIS — J98.01 ACUTE BRONCHOSPASM: ICD-10-CM

## 2018-02-27 PROCEDURE — 99214 OFFICE O/P EST MOD 30 MIN: CPT | Performed by: PHYSICIAN ASSISTANT

## 2018-02-27 RX ORDER — ALBUTEROL SULFATE 90 UG/1
2 AEROSOL, METERED RESPIRATORY (INHALATION) EVERY 6 HOURS PRN
Qty: 1 INHALER | Refills: 0 | Status: SHIPPED | OUTPATIENT
Start: 2018-02-27 | End: 2018-04-04

## 2018-02-27 RX ORDER — AMOXICILLIN 875 MG
875 TABLET ORAL 2 TIMES DAILY
Qty: 20 TABLET | Refills: 0 | Status: SHIPPED | OUTPATIENT
Start: 2018-02-27 | End: 2018-04-04

## 2018-02-27 RX ORDER — BUPROPION HYDROCHLORIDE 150 MG/1
150 TABLET, EXTENDED RELEASE ORAL 2 TIMES DAILY
Qty: 60 TABLET | Refills: 3 | Status: SHIPPED | OUTPATIENT
Start: 2018-02-27 | End: 2018-04-10

## 2018-02-27 ASSESSMENT — PAIN SCALES - GENERAL: PAINLEVEL: MODERATE PAIN (5)

## 2018-02-27 NOTE — NURSING NOTE
Chief Complaint   Patient presents with     URI     1 1/2 months       Initial /80 (BP Location: Right arm, Patient Position: Chair, Cuff Size: Adult Large)  Pulse 80  Temp 98.2  F (36.8  C) (Oral)  Resp 20  Ht 6' (1.829 m)  Wt 293 lb 11.2 oz (133.2 kg)  SpO2 95%  BMI 39.83 kg/m2 Estimated body mass index is 39.83 kg/(m^2) as calculated from the following:    Height as of this encounter: 6' (1.829 m).    Weight as of this encounter: 293 lb 11.2 oz (133.2 kg).  Medication Reconciliation: suzette BARRIENTOS LPN

## 2018-02-27 NOTE — PATIENT INSTRUCTIONS
Sinusitis (Antibiotic Treatment)    The sinuses are air-filled spaces within the bones of the face. They connect to the inside of the nose. Sinusitis is an inflammation of the tissue lining the sinus cavity. Sinus inflammation can occur during a cold. It can also be due to allergies to pollens and other particles in the air. Sinusitis can cause symptoms of sinus congestion and fullness. A sinus infection causes fever, headache and facial pain. There is often green or yellow drainage from the nose or into the back of the throat (post-nasal drip). You have been given antibiotics to treat this condition.  Home care:    Take the full course of antibiotics as instructed. Do not stop taking them, even if you feel better.    Drink plenty of water, hot tea, and other liquids. This may help thin mucus. It also may promote sinus drainage.    Heat may help soothe painful areas of the face. Use a towel soaked in hot water. Or,  the shower and direct the hot spray onto your face. Using a vaporizer along with a menthol rub at night may also help.     An expectorant containing guaifenesin may help thin the mucus and promote drainage from the sinuses.    Over-the-counter decongestants may be used unless a similar medicine was prescribed. Nasal sprays work the fastest. Use one that contains phenylephrine or oxymetazoline. First blow the nose gently. Then use the spray. Do not use these medicines more often than directed on the label or symptoms may get worse. You may also use tablets containing pseudoephedrine. Avoid products that combine ingredients, because side effects may be increased. Read labels. You can also ask the pharmacist for help. (NOTE: Persons with high blood pressure should not use decongestants. They can raise blood pressure.)    Over-the-counter antihistamines may help if allergies contributed to your sinusitis.      Do not use nasal rinses or irrigation during an acute sinus infection, unless told to by  your health care provider. Rinsing may spread the infection to other sinuses.    Use acetaminophen or ibuprofen to control pain, unless another pain medicine was prescribed. (If you have chronic liver or kidney disease or ever had a stomach ulcer, talk with your doctor before using these medicines. Aspirin should never be used in anyone under 18 years of age who is ill with a fever. It may cause severe liver damage.)    Don't smoke. This can worsen symptoms.  Follow-up care  Follow up with your healthcare provider or our staff if you are not improving within the next week.  When to seek medical advice  Call your healthcare provider if any of these occur:    Facial pain or headache becoming more severe    Stiff neck    Unusual drowsiness or confusion    Swelling of the forehead or eyelids    Vision problems, including blurred or double vision    Fever of 100.4 F (38 C) or higher, or as directed by your healthcare provider    Seizure    Breathing problems    Symptoms not resolving within 10 days  Date Last Reviewed: 4/13/2015 2000-2017 [employer]. [address] [place] [address], [place] [id number]. All rights reserved. This information is not intended as a substitute for professional medical care. Always follow your healthcare professional's instructions.

## 2018-02-27 NOTE — MR AVS SNAPSHOT
After Visit Summary   2/27/2018    Maco Chen    MRN: [id number]           Patient Information     Date Of Birth          1990        Visit Information        Provider Department      2/27/2018 9:00 AM Dayana Hannon PA-C [facility]        Today's Diagnoses     Acute sinusitis with symptoms > 10 days    -  1    Acute bronchospasm        Tobacco use disorder          Care Instructions      Sinusitis (Antibiotic Treatment)    The sinuses are air-filled spaces within the bones of the face. They connect to the inside of the nose. Sinusitis is an inflammation of the tissue lining the sinus cavity. Sinus inflammation can occur during a cold. It can also be due to allergies to pollens and other particles in the air. Sinusitis can cause symptoms of sinus congestion and fullness. A sinus infection causes fever, headache and facial pain. There is often green or yellow drainage from the nose or into the back of the throat (post-nasal drip). You have been given antibiotics to treat this condition.  Home care:    Take the full course of antibiotics as instructed. Do not stop taking them, even if you feel better.    Drink plenty of water, hot tea, and other liquids. This may help thin mucus. It also may promote sinus drainage.    Heat may help soothe painful areas of the face. Use a towel soaked in hot water. Or,  the shower and direct the hot spray onto your face. Using a vaporizer along with a menthol rub at night may also help.     An expectorant containing guaifenesin may help thin the mucus and promote drainage from the sinuses.    Over-the-counter decongestants may be used unless a similar medicine was prescribed. Nasal sprays work the fastest. Use one that contains phenylephrine or oxymetazoline. First blow the nose gently. Then use the spray. Do not use these medicines more often than directed on the label or symptoms may get worse. You may also use tablets  containing pseudoephedrine. Avoid products that combine ingredients, because side effects may be increased. Read labels. You can also ask the pharmacist for help. (NOTE: Persons with high blood pressure should not use decongestants. They can raise blood pressure.)    Over-the-counter antihistamines may help if allergies contributed to your sinusitis.      Do not use nasal rinses or irrigation during an acute sinus infection, unless told to by your health care provider. Rinsing may spread the infection to other sinuses.    Use acetaminophen or ibuprofen to control pain, unless another pain medicine was prescribed. (If you have chronic liver or kidney disease or ever had a stomach ulcer, talk with your doctor before using these medicines. Aspirin should never be used in anyone under 18 years of age who is ill with a fever. It may cause severe liver damage.)    Don't smoke. This can worsen symptoms.  Follow-up care  Follow up with your healthcare provider or our staff if you are not improving within the next week.  When to seek medical advice  Call your healthcare provider if any of these occur:    Facial pain or headache becoming more severe    Stiff neck    Unusual drowsiness or confusion    Swelling of the forehead or eyelids    Vision problems, including blurred or double vision    Fever of 100.4 F (38 C) or higher, or as directed by your healthcare provider    Seizure    Breathing problems    Symptoms not resolving within 10 days  Date Last Reviewed: 4/13/2015 2000-2017 [employer]. [address] [place] [address], [place] [address]. All rights reserved. This information is not intended as a substitute for professional medical care. Always follow your healthcare professional's instructions.                Follow-ups after your visit        Follow-up notes from your care team     Return if symptoms worsen or fail to improve.      Who to contact     If you have questions or need follow up information about  "today's clinic visit or your schedule please contact [facility] directly at [phone].  Normal or non-critical lab and imaging results will be communicated to you by MyChart, letter or phone within 4 business days after the clinic has received the results. If you do not hear from us within 7 days, please contact the clinic through [employer] or phone. If you have a critical or abnormal lab result, we will notify you by phone as soon as possible.  Submit refill requests through [employer] or call your pharmacy and they will forward the refill request to us. Please allow 3 business days for your refill to be completed.          Additional Information About Your Visit        [facility] Information     [employer] lets you send messages to your doctor, view your test results, renew your prescriptions, schedule appointments and more. To sign up, go to [email or web address] . Click on \"Log in\" on the left side of the screen, which will take you to the Welcome page. Then click on \"Sign up Now\" on the right side of the page.     You will be asked to enter the access code listed below, as well as some personal information. Please follow the directions to create your username and password.     Your access code is: [email or web address]  Expires: 2018  9:18 AM     Your access code will  in 90 days. If you need help or a new code, please call your [facility] clinic or [phone].        Care EveryWhere ID     This is your Care EveryWhere ID. This could be used by other organizations to access your [facility] medical records  [id number]        Your Vitals Were     Pulse Temperature Respirations Height Pulse Oximetry BMI (Body Mass Index)    80 98.2  F (36.8  C) (Oral) 20 6' (1.829 m) 95% 39.83 kg/m2       Blood Pressure from Last 3 Encounters:   18 138/80   07/10/17 (!) 159/96   17 134/74    Weight from Last 3 Encounters:   18 293 lb 11.2 oz (133.2 kg)   07/10/17 (!) 320 lb (145.2 kg)   17 (!) 319 " lb (144.7 kg)              Today, you had the following     No orders found for display         Today's Medication Changes          These changes are accurate as of 2/27/18  9:18 AM.  If you have any questions, ask your nurse or doctor.               Start taking these medicines.        Dose/Directions    albuterol 108 (90 BASE) MCG/ACT Inhaler   Commonly known as:  PROAIR HFA/PROVENTIL HFA/VENTOLIN HFA   Used for:  Acute bronchospasm   Started by:  Dayana Hannon PA-C        Dose:  2 puff   Inhale 2 puffs into the lungs every 6 hours as needed for shortness of breath / dyspnea or wheezing   Quantity:  1 Inhaler   Refills:  0       amoxicillin 875 MG tablet   Commonly known as:  AMOXIL   Used for:  Acute sinusitis with symptoms > 10 days   Started by:  Dayana Hannon PA-C        Dose:  875 mg   Take 1 tablet (875 mg) by mouth 2 times daily   Quantity:  20 tablet   Refills:  0       buPROPion 150 MG 12 hr tablet   Commonly known as:  WELLBUTRIN SR   Used for:  Tobacco use disorder   Started by:  Dayana Hannon PA-C        Dose:  150 mg   Take 1 tablet (150 mg) by mouth 2 times daily   Quantity:  60 tablet   Refills:  3            Where to get your medicines      These medications were sent to [facility] [place] [address]   [address] [facility] [address]     Phone:  [phone]     albuterol 108 (90 BASE) MCG/ACT Inhaler    amoxicillin 875 MG tablet    buPROPion 150 MG 12 hr tablet                Primary Care Provider Fax #    Physician No Ref-Primary [phone]       No address on file        Equal Access to Services     OPAL JO : Hadii tyler aguilaro Sogilberto, waaxda luqadaha, qaybta kaalmada adetania, alison whitlock. So [facility] [phone].    ATENCIÓN: Si habla español, tiene a tavares disposición servicios gratuitos de asistencia lingüística. Llame al [phone].    We comply with applicable federal civil rights laws and [place] laws. We do  not discriminate on the basis of race, color, national origin, age, disability, sex, sexual orientation, or gender identity.            Thank you!     Thank you for choosing [facility]  for your care. Our goal is always to provide you with excellent care. Hearing back from our patients is one way we can continue to improve our services. Please take a few minutes to complete the written survey that you may receive in the mail after your visit with us. Thank you!             Your Updated Medication List - Protect others around you: Learn how to safely use, store and throw away your medicines at www.disposemymeds.org.          This list is accurate as of 2/27/18  9:18 AM.  Always use your most recent med list.                   Brand Name Dispense Instructions for use Diagnosis    albuterol 108 (90 BASE) MCG/ACT Inhaler    PROAIR HFA/PROVENTIL HFA/VENTOLIN HFA    1 Inhaler    Inhale 2 puffs into the lungs every 6 hours as needed for shortness of breath / dyspnea or wheezing    Acute bronchospasm       amoxicillin 875 MG tablet    AMOXIL    20 tablet    Take 1 tablet (875 mg) by mouth 2 times daily    Acute sinusitis with symptoms > 10 days       buPROPion 150 MG 12 hr tablet    WELLBUTRIN SR    60 tablet    Take 1 tablet (150 mg) by mouth 2 times daily    Tobacco use disorder

## 2018-02-27 NOTE — PROGRESS NOTES
SUBJECTIVE:   Maco Chen is a 27 year old male who presents to clinic today for the following health issues:      Acute Illness   Acute illness concerns: cold symptoms  Onset: 1 1/2 months    Fever: no    Chills/Sweats: YES    Headache (location?): YES    Sinus Pressure:YES    Conjunctivitis:  no    Ear Pain: YES: left    Rhinorrhea: YES    Congestion: YES    Sore Throat: no     Cough: YES-productive of dark green sputum    Wheeze: YES    Decreased Appetite: no    Nausea: YES    Vomiting: no    Diarrhea:  no    Dysuria/Freq.: no    Fatigue/Achiness: YES- fatigue    Sick/Strep Exposure: YES     Therapies Tried and outcome: Dayquil; no relief    Patient has had cold symptoms for the last 4-6 weeks that he states he felt was getting better but in the last week became much worse. He has had hot flashes but no measured fevers. He has had sinus pressure, ear pain and a productive cough as well as some wheezing. He is still smoking and would like to discuss possible medication to help him quit. He was on chantix in the past but got nightmares with this.   -------------------------------------    Problem list and histories reviewed & adjusted, as indicated.  Additional history: as documented    BP Readings from Last 3 Encounters:   02/27/18 138/80   07/10/17 (!) 159/96   05/01/17 134/74    Wt Readings from Last 3 Encounters:   02/27/18 293 lb 11.2 oz (133.2 kg)   07/10/17 (!) 320 lb (145.2 kg)   05/01/17 (!) 319 lb (144.7 kg)         Reviewed and updated as needed this visit by clinical staff  Tobacco  Allergies  Meds  Med Hx  Surg Hx  Fam Hx  Soc Hx      Reviewed and updated as needed this visit by Provider         ROS:  Constitutional, HEENT, cardiovascular, pulmonary, gi and gu systems are negative, except as otherwise noted.    OBJECTIVE:     /80 (BP Location: Right arm, Patient Position: Chair, Cuff Size: Adult Large)  Pulse 80  Temp 98.2  F (36.8  C) (Oral)  Resp 20  Ht 6' (1.829 m)  Wt  293 lb 11.2 oz (133.2 kg)  SpO2 95%  BMI 39.83 kg/m2  Body mass index is 39.83 kg/(m^2).  GENERAL: alert and no distress  EYES: Eyes grossly normal to inspection  HENT: normal cephalic/atraumatic, both ears: clear effusion, rhinorrhea yellow, oropharynx clear, oral mucous membranes moist and sinuses: maxillary tenderness on both sides  NECK: bilateral anterior cervical adenopathy  RESP: bronchial breath sounds right posterior  CV: regular rates and rhythm, no murmur, click or rub, peripheral pulses strong and no peripheral edema  MS: no gross musculoskeletal defects noted, no edema  SKIN: no suspicious lesions or rashes    Diagnostic Test Results:  none     ASSESSMENT/PLAN:     Tobacco Cessation:   reports that he has been smoking Cigarettes.  He has a 5.00 pack-year smoking history. He has never used smokeless tobacco.  Tobacco Cessation Action Plan: Pharmacotherapies : Zyban/Wellbutrin  Self help information given to patient        ICD-10-CM    1. Acute sinusitis with symptoms > 10 days J01.90 amoxicillin (AMOXIL) 875 MG tablet   2. Acute bronchospasm J98.01 albuterol (PROAIR HFA/PROVENTIL HFA/VENTOLIN HFA) 108 (90 BASE) MCG/ACT Inhaler   3. Tobacco use disorder F17.200 buPROPion (WELLBUTRIN SR) 150 MG 12 hr tablet       I will treat for sinus infection and encouraged smoking cessation. I will have him try Zyban to help with this and follow up if cough and sinus symptoms are worsening or not resolving with treatment.   See Patient Instructions    Dayana Hannon PA-C  [facility]

## 2018-04-04 ENCOUNTER — HOSPITAL ENCOUNTER (EMERGENCY)
Facility: CLINIC | Age: 28
Discharge: HOME | End: 2018-04-04
Attending: EMERGENCY MEDICINE | Admitting: EMERGENCY MEDICINE
Payer: COMMERCIAL

## 2018-04-04 VITALS
SYSTOLIC BLOOD PRESSURE: 164 MMHG | TEMPERATURE: 99.3 F | OXYGEN SATURATION: 95 % | DIASTOLIC BLOOD PRESSURE: 82 MMHG | RESPIRATION RATE: 14 BRPM

## 2018-04-04 DIAGNOSIS — S46.202A UNSPECIFIED INJURY OF MUSCLE, FASCIA AND TENDON OF OTHER PARTS OF BICEPS, LEFT ARM, INITIAL ENCOUNTER: ICD-10-CM

## 2018-04-04 PROCEDURE — 99282 EMERGENCY DEPT VISIT SF MDM: CPT | Performed by: EMERGENCY MEDICINE

## 2018-04-04 PROCEDURE — 99284 EMERGENCY DEPT VISIT MOD MDM: CPT | Mod: Z6 | Performed by: EMERGENCY MEDICINE

## 2018-04-04 RX ORDER — HYDROCODONE BITARTRATE AND ACETAMINOPHEN 5; 325 MG/1; MG/1
1-2 TABLET ORAL EVERY 8 HOURS PRN
Qty: 10 TABLET | Refills: 0 | Status: SHIPPED | OUTPATIENT
Start: 2018-04-04 | End: 2018-04-10

## 2018-04-04 ASSESSMENT — ENCOUNTER SYMPTOMS
NECK PAIN: 0
BACK PAIN: 0
WEAKNESS: 1

## 2018-04-04 NOTE — ED AVS SNAPSHOT
[facility] Emergency Department    [address] [place] [address]    Phone:  [phone]    Fax:  [phone]                                       Maco Chen   MRN: [id number]    Department:  [facility] Department   Date of Visit:  4/4/2018           After Visit Summary Signature Page     I have received my discharge instructions, and my questions have been answered. I have discussed any challenges I see with this plan with the nurse or doctor.    ..........................................................................................................................................  Patient/Patient Representative Signature      ..........................................................................................................................................  Patient Representative Print Name and Relationship to Patient    ..................................................               ................................................  Date                                            Time    ..........................................................................................................................................  Reviewed by Signature/Title    ...................................................              ..............................................  Date                                                            Time

## 2018-04-04 NOTE — LETTER
April 4, 2018      To Whom It May Concern:      Maco Chen was seen in our Emergency Department today, 04/04/18.  He has injured his left arm and cannot use this for work at this point   Until he is seen in follow-up early next week.  At that point, they can determine return to work better.    Sincerely,        Harry Kennedy MD

## 2018-04-04 NOTE — ED AVS SNAPSHOT
[facility] Emergency Department    [address] [place] [address]    [place] [address]    Phone:  [phone]    Fax:  [phone]                                       Maco Chen   MRN: [id number]    Department:  [facility] Emergency Department   Date of Visit:  4/4/2018           Patient Information     Date Of Birth          1990        Your diagnoses for this visit were:     Unspecified injury of muscle, fascia and tendon of other parts of biceps, left arm, initial encounter        You were seen by Harry Kennedy MD.      Follow-up Information     Follow up with Stephany Treviño MD.    Specialty:  Family Medicine - Sports Medicine    Why:  early next week     Contact information:    [address] [facility] [address]  [address] [facility] [address]  [facility] [address]  [phone]          Discharge Instructions       Ice, rest and use sling as needed.    Ibuprofen up to 600 mg 4 times a day as needed for pain.  Tylenol up to thousand milligrams 4 times a day as needed for pain.    Your next 10 appointments already scheduled     Apr 10, 2018 11:20 AM CDT   New Visit with Stephany Treviño MD   [facility]    [address] [facility] [address] [phone]              24 Hour Appointment Hotline       To make an appointment at any [facility], call [phone] ([phone]). If you don't have a family doctor or clinic, we will help you find one. [facility] are conveniently located to serve the needs of you and your family.             Review of your medicines      START taking        Dose / Directions Last dose taken    HYDROcodone-acetaminophen 5-325 MG per tablet   Commonly known as:  NORCO   Dose:  1-2 tablet   Quantity:  10 tablet        Take 1-2 tablets by mouth every 8 hours as needed for moderate to severe pain   Refills:  0          Our records show that you are taking the medicines listed below. If  "these are incorrect, please call your family doctor or clinic.        Dose / Directions Last dose taken    buPROPion 150 MG 12 hr tablet   Commonly known as:  WELLBUTRIN SR   Dose:  150 mg   Quantity:  60 tablet        Take 1 tablet (150 mg) by mouth 2 times daily   Refills:  3                Prescriptions were sent or printed at these locations (1 Prescription)                   Other Prescriptions                Printed at Department/Unit printer (1 of 1)         HYDROcodone-acetaminophen (NORCO) 5-325 MG per tablet                Orders Needing Specimen Collection     None      Pending Results     No orders found from 4/2/2018 to 4/5/2018.            Pending Culture Results     No orders found from 4/2/2018 to 4/5/2018.            Pending Results Instructions     If you had any lab results that were not finalized at the time of your Discharge, you can call the ED Lab Result RN at [phone]. You will be contacted by this team for any positive Lab results or changes in treatment. The nurses are available 7 days a week from 10A to 6:30P.  You can leave a message 24 hours per day and they will return your call.        Thank you for choosing [facility]       Thank you for choosing [facility] for your care. Our goal is always to provide you with excellent care. Hearing back from our patients is one way we can continue to improve our services. Please take a few minutes to complete the written survey that you may receive in the mail after you visit with us. Thank you!        [employer] Information     EndPlay lets you send messages to your doctor, view your test results, renew your prescriptions, schedule appointments and more. To sign up, go to [email or web address] . Click on \"Log in\" on the left side of the screen, which will take you to the Welcome page. Then click on \"Sign up Now\" on the right side of the page.     You will be asked to enter the access code listed below, as well as some personal information. Please " follow the directions to create your username and password.     Your access code is: [email or web address]  Expires: 2018 10:18 AM     Your access code will  in 90 days. If you need help or a new code, please call your [place] clinic or [phone].        Care EveryWhere ID     This is your Care EveryWhere ID. This could be used by other organizations to access your [place] medical records  [id number]        Equal Access to Services     [facility] Permanente Medical CenterJUAN CARLOS : Hadii tyler boogie hadasho Soomaali, waaxda luqadaha, qaybta kaalmada adeegyaconchita, alison moran . So [facility] [phone].    ATENCIÓN: Si habla español, tiene a tavares disposición servicios gratuitos de asistencia lingüística. Llame al [phone].    We comply with applicable federal civil rights laws and [place] laws. We do not discriminate on the basis of race, color, national origin, age, disability, sex, sexual orientation, or gender identity.            After Visit Summary       This is your record. Keep this with you and show to your community pharmacist(s) and doctor(s) at your next visit.

## 2018-04-04 NOTE — DISCHARGE INSTRUCTIONS
Ice, rest and use sling as needed.    Ibuprofen up to 600 mg 4 times a day as needed for pain.  Tylenol up to thousand milligrams 4 times a day as needed for pain.

## 2018-04-04 NOTE — ED PROVIDER NOTES
"  History     Chief Complaint   Patient presents with     Arm Pain     The history is provided by the patient.     Maco Chen is a 27 year old male who presents to the ED for evaluation of left arm pain. Patient states that he slipped going down a ramp this morning, but landed on his buttock. 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  Pain in the left bicep is severe with movement of the arm.  Pain is sharp.      Problem List:    Patient Active Problem List    Diagnosis Date Noted     Knee Pain, effusion, right, trauma 04/14/2010     Priority: Medium     Tobacco use disorder 12/13/2007     Priority: Medium     Oppositional defiant disorder      Priority: Medium     Problem list name updated by automated process. Provider to review       Disturbance in sleep behavior 03/15/2006     Priority: Medium     Problem list name updated by automated process. Provider to review       Hypertrophy of tonsils alone 03/15/2006     Priority: Medium     Morbid obesity with body mass index of 40.0-44.9 in adult (H) 02/27/2003     Priority: Medium     Problem list name updated by automated process. Provider to review          Past Medical History:    Past Medical History:   Diagnosis Date     Hypertrophy of tonsils alone      Obesity, unspecified      Oppositional defiant disorder of childhood or adolescence      Sleep disturbance, unspecified        Past Surgical History:    Past Surgical History:   Procedure Laterality Date     HC REMOVAL PREPATELLA BURSA  05/27/10     TONSILLECTOMY & ADENOIDECTOMY  3/21/2006       Family History:    Family History   Problem Relation Age of Onset     CANCER Maternal Grandfather      throat cancer     Allergies Brother      Allergies Sister        Social History:  Marital Status:   [2]  Social History "   Substance Use Topics     Smoking status: Current Every Day Smoker     Packs/day: 1.00     Years: 5.00     Types: Cigarettes     Smokeless tobacco: Never Used     Alcohol use 0.0 oz/week     0 Standard drinks or equivalent per week      Comment: rare        Medications:      HYDROcodone-acetaminophen (NORCO) 5-325 MG per tablet   buPROPion (WELLBUTRIN SR) 150 MG 12 hr tablet         Review of Systems   Musculoskeletal: Negative for back pain and neck pain.        Left arm pain   Neurological: Positive for weakness (of left arm).   All other systems reviewed and are negative.      Physical Exam   BP: 164/82  Heart Rate: 104  Temp: 99.3  F (37.4  C)  Resp: 14  SpO2: 95 %      Physical Exam   Constitutional: He is oriented to person, place, and time. He appears well-developed and well-nourished. No distress.   HENT:   Head: Normocephalic and atraumatic.   Eyes: Conjunctivae are normal. No scleral icterus.   Neck: Normal range of motion. Neck supple.   Cardiovascular: Regular rhythm and normal heart sounds.  Tachycardia present.  Exam reveals no gallop and no friction rub.    No murmur heard.  Pulses:       Radial pulses are 2+ on the right side   Pulmonary/Chest: Effort normal and breath sounds normal. No respiratory distress. He has no decreased breath sounds. He has no wheezes. He has no rhonchi. He has no rales.   Musculoskeletal:        Left elbow: He exhibits decreased range of motion (with flexion; none with extension).   No bony tenderness to left arm; exquisite tenderness and fullness of left bicep   Lymphadenopathy:     He has no cervical adenopathy.   Neurological: He is alert and oriented to person, place, and time.   CMS intact of upper extremities bilaterally   Skin: Skin is warm and dry. No rash noted. No pallor.   Psychiatric: He has a normal mood and affect. His behavior is normal.   Nursing note and vitals reviewed.    ED Course     ED Course     Procedures               Critical Care time:  none                No results found for this or any previous visit (from the past 24 hour(s)).    Medications - No data to display    Assessments & Plan (with Medical Decision Making)  27-year-old male with a fall today and now some severe left bicep pain that is exacerbated with any movement.  No bony tenderness to the arm.  Suspect he has a soft tissue injury to the left bicep.  Have recommended ice, rest and given a sling to use as needed.  We discussed the option of x-rays, he has no bony pain and appears to be tender isolated to the bicep region.  He has declined x-rays at this point which is reasonable.  Tylenol and/or ibuprofen as needed for pain.  Follow-up  with Dr. Ester Treviño for further planning next week.     I have reviewed the nursing notes.    I have reviewed the findings, diagnosis, plan and need for follow up with the patient.       Discharge Medication List as of 4/4/2018  3:09 PM      START taking these medications    Details   HYDROcodone-acetaminophen (NORCO) 5-325 MG per tablet Take 1-2 tablets by mouth every 8 hours as needed for moderate to severe pain, Disp-10 tablet, R-0, Local Print             Final diagnoses:   Unspecified injury of muscle, fascia and tendon of other parts of biceps, left arm, initial encounter     This document serves as a record of services personally performed by Harry Kennedy MD. It was created on their behalf by Shalini Hopepr, a trained medical scribe. The creation of this record is based on the provider's personal observations and the statements of the patient. This document has been checked and approved by the attending provider.    Note: Chart documentation done in part with Dragon Voice Recognition software. Although reviewed after completion, some word and grammatical errors may remain.      4/4/2018   [facility] EMERGENCY DEPARTMENT     Harry Kennedy MD  04/04/18 [id number]

## 2018-04-04 NOTE — ED NOTES
"Presents with left arm pain-denies injury. \"I did slip and fall on my but this morning but I didn't hit my arm\". 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 He noted he irritation this AM but pain and weakness to arm progressed this afternoon  "

## 2018-04-10 ENCOUNTER — RADIANT APPOINTMENT (OUTPATIENT)
Dept: GENERAL RADIOLOGY | Facility: CLINIC | Age: 28
End: 2018-04-10
Attending: PHYSICAL MEDICINE & REHABILITATION
Payer: COMMERCIAL

## 2018-04-10 ENCOUNTER — OFFICE VISIT (OUTPATIENT)
Dept: ORTHOPEDICS | Facility: CLINIC | Age: 28
End: 2018-04-10
Payer: COMMERCIAL

## 2018-04-10 VITALS
DIASTOLIC BLOOD PRESSURE: 84 MMHG | WEIGHT: 300 LBS | BODY MASS INDEX: 40.63 KG/M2 | HEIGHT: 72 IN | SYSTOLIC BLOOD PRESSURE: 134 MMHG

## 2018-04-10 DIAGNOSIS — M25.522 LEFT ELBOW PAIN: ICD-10-CM

## 2018-04-10 DIAGNOSIS — M25.522 LEFT ELBOW PAIN: Primary | ICD-10-CM

## 2018-04-10 PROCEDURE — 73080 X-RAY EXAM OF ELBOW: CPT | Mod: TC

## 2018-04-10 PROCEDURE — 99204 OFFICE O/P NEW MOD 45 MIN: CPT | Performed by: PHYSICAL MEDICINE & REHABILITATION

## 2018-04-10 RX ORDER — HYDROCODONE BITARTRATE AND ACETAMINOPHEN 5; 325 MG/1; MG/1
1-2 TABLET ORAL EVERY 8 HOURS PRN
Qty: 20 TABLET | Refills: 0 | Status: SHIPPED | OUTPATIENT
Start: 2018-04-10 | End: 2018-07-03

## 2018-04-10 NOTE — MR AVS SNAPSHOT
After Visit Summary   4/10/2018    Maco Chen    MRN: [id number]           Patient Information     Date Of Birth          1990        Visit Information        Provider Department      4/10/2018 11:20 AM Stephany Treviño MD [facility]        Today's Diagnoses     Left elbow pain    -  1      Care Instructions    -MRI of the left elbow ordered.  Please call [phone]   -Letter to remain off work for 2 weeks.  May need to extend time off.  -Ice or heat 15-20 minutes as needed (Avoid sleeping on a heating pad or ice)  -Patient's preferred over the counter medications as directed on packaging as needed for pain or soreness.  Please take ibuprofen with food. Do not premedicate prior to activity.  -Norco as needed for severe pain.  -Use the immobilizer as needed for comfort and support.    -Follow up with your primary care provider regarding elevated blood pressure.    -We will call you with the results of the MRI.    Please call with questions or concerns.                  Follow-ups after your visit        Future tests that were ordered for you today     Open Future Orders        Priority Expected Expires Ordered    MR Elbow Left w/o Contrast Routine  4/10/2019 4/10/2018            Who to contact     If you have questions or need follow up information about today's clinic visit or your schedule please contact [facility] directly at [phone].  Normal or non-critical lab and imaging results will be communicated to you by MyChart, letter or phone within 4 business days after the clinic has received the results. If you do not hear from us within 7 days, please contact the clinic through MyChart or phone. If you have a critical or abnormal lab result, we will notify you by phone as soon as possible.  Submit refill requests through invino or call your pharmacy and they will forward the refill request to us. Please  "allow 3 business days for your refill to be completed.          Additional Information About Your Visit        MyChart Information     [employer] lets you send messages to your doctor, view your test results, renew your prescriptions, schedule appointments and more. To sign up, go to [email or web address] . Click on \"Log in\" on the left side of the screen, which will take you to the Welcome page. Then click on \"Sign up Now\" on the right side of the page.     You will be asked to enter the access code listed below, as well as some personal information. Please follow the directions to create your username and password.     Your access code is: [email or web address]  Expires: 2018 10:18 AM     Your access code will  in 90 days. If you need help or a new code, please call your [place] clinic or [phone].        Care EveryWhere ID     This is your Care EveryWhere ID. This could be used by other organizations to access your [place] medical records  [id number]        Your Vitals Were     Height BMI (Body Mass Index)                6' (1.829 m) 40.69 kg/m2           Blood Pressure from Last 3 Encounters:   04/10/18 146/90   18 164/82   18 138/80    Weight from Last 3 Encounters:   04/10/18 300 lb (136.1 kg)   18 293 lb 11.2 oz (133.2 kg)   07/10/17 (!) 320 lb (145.2 kg)                 Where to get your medicines      Some of these will need a paper prescription and others can be bought over the counter.  Ask your nurse if you have questions.     Bring a paper prescription for each of these medications     HYDROcodone-acetaminophen 5-325 MG per tablet          Primary Care Provider Office Phone # Fax #    Dayana Hannon PA-C [phone] [address] [facility] [address]        Equal Access to Services     RADHA JO AH: Tonia aguilaro Sogilberto, waaxda luqadaha, qaybta kaalmada mertyaconchita, alison whitlock. So [facility] " [phone].    ATENCIÓN: Si wolf adam, tiene a tavares disposición servicios gratuitos de asistencia lingüística. Clara al [phone].    We comply with applicable federal civil rights laws and [place] laws. We do not discriminate on the basis of race, color, national origin, age, disability, sex, sexual orientation, or gender identity.            Thank you!     Thank you for choosing [facility]  for your care. Our goal is always to provide you with excellent care. Hearing back from our patients is one way we can continue to improve our services. Please take a few minutes to complete the written survey that you may receive in the mail after your visit with us. Thank you!             Your Updated Medication List - Protect others around you: Learn how to safely use, store and throw away your medicines at www.disposemymeds.org.          This list is accurate as of 4/10/18 12:02 PM.  Always use your most recent med list.                   Brand Name Dispense Instructions for use Diagnosis    HYDROcodone-acetaminophen 5-325 MG per tablet    NORCO    20 tablet    Take 1-2 tablets by mouth every 8 hours as needed for moderate to severe pain    Left elbow pain

## 2018-04-10 NOTE — PATIENT INSTRUCTIONS
-MRI of the left elbow ordered.  Please call [phone]   -Letter to remain off work for 2 weeks.  May need to extend time off.  -Ice or heat 15-20 minutes as needed (Avoid sleeping on a heating pad or ice)  -Patient's preferred over the counter medications as directed on packaging as needed for pain or soreness.  Please take ibuprofen with food. Do not premedicate prior to activity.  -Norco as needed for severe pain.  -Use the immobilizer as needed for comfort and support.    -Follow up with your primary care provider regarding elevated blood pressure.    -We will call you with the results of the MRI.    Please call with questions or concerns.

## 2018-04-10 NOTE — PROGRESS NOTES
Sports Medicine Clinic Visit    PCP: Dayana Hannon    CC: Patient presents with:  Musculoskeletal Problem: left bicep      HPI:  Maco Chen is a 27 year old male who is seen as an ER referral.   He notes left arm, bicep pain, when he lifted a 5lb object at work and had immediate intense pain. He did have a fall a few hours prior to this pain as he was walking down an icy ramp. He is unsure if he fell onto his arm. He feels that the pain is starting to improve. He rates the pain at a  8/10 at its worst and a 3/10 currently.  Symptoms are relieved with Norco and sling. Symptoms are worsened by nothing at this time. He has not used the arm since being seen in the ED. He endorses bruising, tingling and burning sensation.   He denies swelling, popping, grinding, catching, locking, instability, numbness, weakness, pain in other joints and fever, chills.  Other treatment has included cold compresses, Tylenol and ibuprofen.     Review of Systems:  Musculoskeletal: as above  Remainder of review of systems is negative including constitutional, eyes, ENT, CV, pulmonary, GI, , endocrine, skin, hematologic, and neurologic except as noted in HPI or medical history.    History reviewed. No pertinent past surgical/medical/family/social history other than as mentioned in HPI.    Patient Active Problem List   Diagnosis     Morbid obesity with body mass index of 40.0-44.9 in adult (H)     Disturbance in sleep behavior     Hypertrophy of tonsils alone     Oppositional defiant disorder     Tobacco use disorder     Knee Pain, effusion, right, trauma     Past Surgical History:   Procedure Laterality Date     HC REMOVAL PREPATELLA BURSA  05/27/10     TONSILLECTOMY & ADENOIDECTOMY  3/21/2006     Family History   Problem Relation Age of Onset     CANCER Maternal Grandfather      throat cancer     Allergies Brother      Allergies Sister      Social History     Social History     Marital status:      Spouse name: N/A      Number of children: N/A     Years of education: N/A     Occupational History     student Student     Social History Main Topics     Smoking status: Current Every Day Smoker     Packs/day: 1.00     Years: 5.00     Types: Cigarettes     Smokeless tobacco: Never Used     Alcohol use 0.0 oz/week     0 Standard drinks or equivalent per week      Comment: rare     Drug use: No     Sexual activity: Yes     Partners: Female     Other Topics Concern      Service Not Asked     NA     Blood Transfusions No     Caffeine Concern No     Occupational Exposure No     Hobby Hazards No     Sleep Concern Yes     Stress Concern No     Weight Concern Yes     Special Diet No     Back Care No     Exercise Yes     Bike Helmet No     Seat Belt Yes     Self-Exams No     Social History Narrative       He works installing [employer]      Current Outpatient Prescriptions   Medication     HYDROcodone-acetaminophen (NORCO) 5-325 MG per tablet     No current facility-administered medications for this visit.      Allergies   Allergen Reactions     Tessalon [Benzonatate] Itching, Swelling and Rash     Red flushed face, hives to face, ears and jaw area very swollen       Zithromax [Azithromycin Dihydrate] Itching, Swelling and Rash     Red flushed face, hives to face, ears and jaw area very swollen         Objective:  /84  Ht 6' (1.829 m)  Wt 300 lb (136.1 kg)  BMI 40.69 kg/m2    General: Alert and in no distress    Head: Normocephalic, atraumatic  Eyes: no scleral icterus or conjunctival erythema   Oropharynx:  Mucous membranes moist  Skin: no erythema, petechiae, or jaundice  CV: regular rhythm by palpation, 2+ distal pulses  Resp: normal respiratory effort without conversational dyspnea   Psych: normal mood and affect    Gait: Non-antalgic, appropriate coordination and balance   Neuro: Motor strength and sensation as noted below    Musculoskeletal:    Bilateral Elbow exam:    Inspection: Left medial elbow  swelling    Palpation:  Tender over the left medial elbow and antecubital fossa    ROM:      full with flexion, extension, forearm supination and pronation bilaterally    Strength:   Right:  5/5 elbow flexion/extension, forearm supination/pronation, wrist flexion/extension,  strength, finger abduction  Left:  5-/5 elbow extension, 5/5 elbow flexion, 5-/5 forearm supination, 5/5 forearm pronation, 5/5 wrist flexion/extension, 5/5  strength, 5/5 finger abduction    Special Tests:   Squeezing distal biceps muscle causes more forearm supination on the right than on the left  Able to hook biceps tendon on right and pull it forward, difficult time palpating the distal biceps tendon on the left    Sensation: Tingling over the left medial elbow      Radiology:  Independent visualization of images performed and reviewed with Ilir and his wife.    Recent Results (from the past 744 hour(s))   XR Elbow Left G/E 3 Views    Narrative    LEFT ELBOW THREE OR MORE VIEWS   4/10/2018 11:23 AM     HISTORY:  Left elbow pain.    COMPARISON: None.      Impression    IMPRESSION: No joint effusion. No acute fracture or subluxation. Joint  spaces are well-preserved.    MAGALY MARISCAL MD         Assessment:  1. Left elbow pain        Plan:  Discussed the assessment with the patient and developed a plan together:  -Concern for distal biceps tendon tear.  -MRI of the left elbow ordered.  Please call [phone]   -Letter to remain off work for 2 weeks.  May need to extend time off pending MRI results.  -Ice or heat 15-20 minutes as needed (Avoid sleeping on a heating pad or ice)  -Patient's preferred over the counter medications as directed on packaging as needed for pain or soreness.  Please take ibuprofen with food. Do not premedicate prior to activity.  -Norco as needed for severe pain.  -Use the immobilizer as needed for comfort and support.    -Follow up with his primary care provider regarding elevated  blood pressure.    -We will call him with the results of the MRI.    Please call with questions or concerns.          Ester Treviño MD, CAQ  [facility]

## 2018-04-10 NOTE — LETTER
April 10, 2018      Maco Chen  [address] [facility] [address]        To Whom It May Concern:    Maco Chen  was seen on 4/10/18 for a left elbow injury.  He is unable to work for 2 weeks.  Time off work may be extended depending on re-evaluation.      Sincerely,        Stephany Treviño MD

## 2018-04-10 NOTE — LETTER
4/10/2018         RE: Maco Chen  [address]  [facility] [address]        Dear Colleague,    Thank you for referring your patient, Maco Chen, to the [facility]. Please see a copy of my visit note below.    Sports Medicine Clinic Visit    PCP: Dayana Hannon    CC: Patient presents with:  Musculoskeletal Problem: left bicep      HPI:  Maco Chen is a 27 year old male who is seen as an ER referral.   He notes left arm, bicep pain, when he lifted a 5lb object at work and had immediate intense pain. He did have a fall a few hours prior to this pain as he was walking down an icy ramp. He is unsure if he fell onto his arm. He feels that the pain is starting to improve. He rates the pain at a  8/10 at its worst and a 3/10 currently.  Symptoms are relieved with Norco and sling. Symptoms are worsened by nothing at this time. He has not used the arm since being seen in the ED. He endorses bruising, tingling and burning sensation.   He denies swelling, popping, grinding, catching, locking, instability, numbness, weakness, pain in other joints and fever, chills.  Other treatment has included cold compresses, Tylenol and ibuprofen.     Review of Systems:  Musculoskeletal: as above  Remainder of review of systems is negative including constitutional, eyes, ENT, CV, pulmonary, GI, , endocrine, skin, hematologic, and neurologic except as noted in HPI or medical history.    History reviewed. No pertinent past surgical/medical/family/social history other than as mentioned in HPI.    Patient Active Problem List   Diagnosis     Morbid obesity with body mass index of 40.0-44.9 in adult (H)     Disturbance in sleep behavior     Hypertrophy of tonsils alone     Oppositional defiant disorder     Tobacco use disorder     Knee Pain, effusion, right, trauma     Past Surgical History:   Procedure Laterality Date     HC REMOVAL PREPATELLA BURSA  05/27/10     TONSILLECTOMY &  ADENOIDECTOMY  3/21/2006     Family History   Problem Relation Age of Onset     CANCER Maternal Grandfather      throat cancer     Allergies Brother      Allergies Sister      Social History     Social History     Marital status:      Spouse name: N/A     Number of children: N/A     Years of education: N/A     Occupational History     student Student     Social History Main Topics     Smoking status: Current Every Day Smoker     Packs/day: 1.00     Years: 5.00     Types: Cigarettes     Smokeless tobacco: Never Used     Alcohol use 0.0 oz/week     0 Standard drinks or equivalent per week      Comment: rare     Drug use: No     Sexual activity: Yes     Partners: Female     Other Topics Concern      Service Not Asked     NA     Blood Transfusions No     Caffeine Concern No     Occupational Exposure No     Hobby Hazards No     Sleep Concern Yes     Stress Concern No     Weight Concern Yes     Special Diet No     Back Care No     Exercise Yes     Bike Helmet No     Seat Belt Yes     Self-Exams No     Social History Narrative       He works [employer]      Current Outpatient Prescriptions   Medication     HYDROcodone-acetaminophen (NORCO) 5-325 MG per tablet     No current facility-administered medications for this visit.      Allergies   Allergen Reactions     Tessalon [Benzonatate] Itching, Swelling and Rash     Red flushed face, hives to face, ears and jaw area very swollen       Zithromax [Azithromycin Dihydrate] Itching, Swelling and Rash     Red flushed face, hives to face, ears and jaw area very swollen         Objective:  /84  Ht 6' (1.829 m)  Wt 300 lb (136.1 kg)  BMI 40.69 kg/m2    General: Alert and in no distress    Head: Normocephalic, atraumatic  Eyes: no scleral icterus or conjunctival erythema   Oropharynx:  Mucous membranes moist  Skin: no erythema, petechiae, or jaundice  CV: regular rhythm by palpation, 2+ distal pulses  Resp: normal respiratory effort without  conversational dyspnea   Psych: normal mood and affect    Gait: Non-antalgic, appropriate coordination and balance   Neuro: Motor strength and sensation as noted below    Musculoskeletal:    Bilateral Elbow exam:    Inspection: Left medial elbow swelling    Palpation:  Tender over the left medial elbow and antecubital fossa    ROM:      full with flexion, extension, forearm supination and pronation bilaterally    Strength:   Right:  5/5 elbow flexion/extension, forearm supination/pronation, wrist flexion/extension,  strength, finger abduction  Left:  5-/5 elbow extension, 5/5 elbow flexion, 5-/5 forearm supination, 5/5 forearm pronation, 5/5 wrist flexion/extension, 5/5  strength, 5/5 finger abduction    Special Tests:   Squeezing distal biceps muscle causes more forearm supination on the right than on the left  Able to hook biceps tendon on right and pull it forward, difficult time palpating the distal biceps tendon on the left    Sensation: Tingling over the left medial elbow      Radiology:  Independent visualization of images performed and reviewed with Ilir and his wife.    Recent Results (from the past 744 hour(s))   XR Elbow Left G/E 3 Views    Narrative    LEFT ELBOW THREE OR MORE VIEWS   4/10/2018 11:23 AM     HISTORY:  Left elbow pain.    COMPARISON: None.      Impression    IMPRESSION: No joint effusion. No acute fracture or subluxation. Joint  spaces are well-preserved.    MAGALY MARISCAL MD         Assessment:  1. Left elbow pain        Plan:  Discussed the assessment with the patient and developed a plan together:  -Concern for distal biceps tendon tear.  -MRI of the left elbow ordered.  Please call [phone]   -Letter to remain off work for 2 weeks.  May need to extend time off pending MRI results.  -Ice or heat 15-20 minutes as needed (Avoid sleeping on a heating pad or ice)  -Patient's preferred over the counter medications as directed on packaging as needed for  pain or soreness.  Please take ibuprofen with food. Do not premedicate prior to activity.  -Norco as needed for severe pain.  -Use the immobilizer as needed for comfort and support.    -Follow up with his primary care provider regarding elevated blood pressure.    -We will call him with the results of the MRI.    Please call with questions or concerns.          Ester Treviño MD, [facility]      Again, thank you for allowing me to participate in the care of your patient.        Sincerely,        Stephany Treviño MD

## 2018-04-12 ENCOUNTER — HOSPITAL ENCOUNTER (OUTPATIENT)
Dept: MRI IMAGING | Facility: CLINIC | Age: 28
Discharge: HOME | End: 2018-04-12
Attending: PHYSICAL MEDICINE & REHABILITATION | Admitting: PHYSICAL MEDICINE & REHABILITATION
Payer: COMMERCIAL

## 2018-04-12 DIAGNOSIS — M25.522 LEFT ELBOW PAIN: ICD-10-CM

## 2018-04-12 PROCEDURE — 73221 MRI JOINT UPR EXTREM W/O DYE: CPT | Mod: LT

## 2018-04-13 ENCOUNTER — TELEPHONE (OUTPATIENT)
Dept: ORTHOPEDICS | Facility: OTHER | Age: 28
End: 2018-04-13

## 2018-04-13 NOTE — TELEPHONE ENCOUNTER
Spoke with patient regarding their MRI results and plan. He will call back to schedule a follow up.     Gaye Landry M.Ed., ATR, ATC

## 2018-04-13 NOTE — TELEPHONE ENCOUNTER
Stephany Treviño MD  P [facility]; Gaye Landry, ATC                     No biceps tendon tear.  Recommend following up in clinic to re-evaluate and discuss next steps.              MR Elbow Left w/o Contrast   Status:  Final result   Visible to patient:  No (Not Released) Dx:  Left elbow pain Order: [id number]       Notes Recorded by Stephany Treviño MD on 4/12/2018 at 3:12 PM  No biceps tendon tear.  Recommend following up in clinic to re-evaluate and discuss next steps.       Details        Reading Physician Reading Date Result Priority       Sanjiv Gonzalez MD 4/12/2018            Narrative             MR ELBOW LEFT WITHOUT CONTRAST April 12, 2018 9:13 AM    HISTORY: Left elbow injury, concern for distal biceps tendon tear.  Left elbow pain.    TECHNIQUE: Coronal T1, STIR, GRE.  Axial T1 and T2.  Sagittal STIR.     FINDINGS:   Osseous and Cartilaginous Structures: Unremarkable. No fracture, bone  contusion or chondromalacia identified. No capitellar osteochondral  lesion identified.    Medial Ligaments: The anterior and posterior bands appear intact.    Lateral Ligaments: The radial collateral ligament, annular ligament,  and lateral ulnar collateral ligament appear intact. Radiocapitellar  alignment appears within normal limits.    Common Flexor Tendon: Unremarkable. No tendinosis or tear is  identified.    Common Extensor Tendon: Unremarkable. No tendinosis or tear is  identified.    Biceps and Triceps Tendons: Unremarkable. No tendinosis or tear is  identified.    Joint space: There is a physiologic amount of fluid in the elbow  joint.    Additional Findings: Anteromedial subcutaneous edema is noted in the  distal aspect of the arm on the proximal edge of the scan. The cubital  tunnel and ulnar nerve appear within normal limits. No muscle edema or  atrophy. No evidence of olecranon or bicipitoradial bursitis              Impression             IMPRESSION:   1. Nonspecific  subcutaneous edema along the anteromedial aspect of the  distal arm on the edge of the scan. This is of indeterminate etiology.  2. No evidence of distal biceps tendinosis or tear.    SELVIN CHOU MD

## 2018-04-16 ENCOUNTER — OFFICE VISIT (OUTPATIENT)
Dept: ORTHOPEDICS | Facility: CLINIC | Age: 28
End: 2018-04-16
Payer: COMMERCIAL

## 2018-04-16 VITALS
BODY MASS INDEX: 40.63 KG/M2 | WEIGHT: 300 LBS | SYSTOLIC BLOOD PRESSURE: 124 MMHG | HEIGHT: 72 IN | DIASTOLIC BLOOD PRESSURE: 84 MMHG

## 2018-04-16 DIAGNOSIS — S59.902D ELBOW INJURY, LEFT, SUBSEQUENT ENCOUNTER: Primary | ICD-10-CM

## 2018-04-16 PROCEDURE — 99213 OFFICE O/P EST LOW 20 MIN: CPT | Performed by: PHYSICAL MEDICINE & REHABILITATION

## 2018-04-16 NOTE — PROGRESS NOTES
Sports Medicine Clinic Visit - Interim History April 16, 2018    Initial Visit Date 4/10/2018  Initial Injury Date 4/4/2018    PCP: Dayana Hannon SAMANTA Chen is a 27 year old male who is seen in follow up for a left elbow injury. Since last visit on 4/10/2018 patient has been doing well. The pain is better. He is continuing to have weakness through the forearm and middle to pinky finger. He rates the pain at a 2/10 currently.  Symptoms are relieved with rest.  Symptoms are worsened by holding his child (25 lbs).     - Now ~ 12 days from initial injury      Review of Systems  Musculoskeletal: as above  Remainder of review of systems is negative including constitutional, eyes, ENT, CV, pulmonary, GI, , endocrine, skin, hematologic, and neurologic except as noted in HPI or medical history.    History reviewed. No pertinent past surgical/medical/family/social history other than as mentioned in HPI.    Past Medical History:   Diagnosis Date     Hypertrophy of tonsils alone      Obesity, unspecified      Oppositional defiant disorder of childhood or adolescence      Sleep disturbance, unspecified      Past Surgical History:   Procedure Laterality Date     HC REMOVAL PREPATELLA BURSA  05/27/10     TONSILLECTOMY & ADENOIDECTOMY  3/21/2006     Family History   Problem Relation Age of Onset     CANCER Maternal Grandfather      throat cancer     Allergies Brother      Allergies Sister      Social History     Social History     Marital status:      Spouse name: N/A     Number of children: N/A     Years of education: N/A     Occupational History     student Student     Social History Main Topics     Smoking status: Current Every Day Smoker     Packs/day: 1.00     Years: 5.00     Types: Cigarettes     Smokeless tobacco: Never Used     Alcohol use 0.0 oz/week     0 Standard drinks or equivalent per week      Comment: rare     Drug use: No     Sexual activity: Yes     Partners: Female     Other Topics  Concern      Service Not Asked     NA     Blood Transfusions No     Caffeine Concern No     Occupational Exposure No     Hobby Hazards No     Sleep Concern Yes     Stress Concern No     Weight Concern Yes     Special Diet No     Back Care No     Exercise Yes     Bike Helmet No     Seat Belt Yes     Self-Exams No     Social History Narrative       He works installing cabinets.     Current Outpatient Prescriptions   Medication     HYDROcodone-acetaminophen (NORCO) 5-325 MG per tablet     No current facility-administered medications for this visit.      Allergies   Allergen Reactions     Tessalon [Benzonatate] Itching, Swelling and Rash     Red flushed face, hives to face, ears and jaw area very swollen       Zithromax [Azithromycin Dihydrate] Itching, Swelling and Rash     Red flushed face, hives to face, ears and jaw area very swollen         Objective:  /84  Ht 6' (1.829 m)  Wt 300 lb (136.1 kg)  BMI 40.69 kg/m2    General: Alert and in no distress    Head: Normocephalic, atraumatic  Eyes: no scleral icterus or conjunctival erythema   Oropharynx:  Mucous membranes moist  Skin: no erythema, petechiae, or jaundice  CV: regular rhythm by palpation, 2+ distal pulses  Resp: normal respiratory effort without conversational dyspnea   Psych: normal mood and affect    Gait: Non-antalgic, appropriate coordination and balance   Neuro: Motor strength and sensation as noted below    Musculoskeletal:    Bilateral Elbow exam:    Inspection: Yellowish bruising left proximal medial elbow    Palpation:  Slightly tender over the bruise as above    ROM:      full with flexion, extension, forearm supination and pronation bilaterally    Strength: 5/5 bilateral elbow flexion/extension, forearm supination/pronation, wrist flexion/extension,  strength, finger abduction    Sensation: Intact to light touch in the bilateral upper limbs      Radiology:  Independent visualization of images performed  Recent Results (from the  past 744 hour(s))   XR Elbow Left G/E 3 Views    Narrative    LEFT ELBOW THREE OR MORE VIEWS   4/10/2018 11:23 AM     HISTORY:  Left elbow pain.    COMPARISON: None.      Impression    IMPRESSION: No joint effusion. No acute fracture or subluxation. Joint  spaces are well-preserved.    MAGALY MARISCAL MD   MR Elbow Left w/o Contrast    Narrative    MR ELBOW LEFT WITHOUT CONTRAST April 12, 2018 9:13 AM    HISTORY: Left elbow injury, concern for distal biceps tendon tear.  Left elbow pain.    TECHNIQUE: Coronal T1, STIR, GRE.  Axial T1 and T2.  Sagittal STIR.     FINDINGS:   Osseous and Cartilaginous Structures: Unremarkable. No fracture, bone  contusion or chondromalacia identified. No capitellar osteochondral  lesion identified.    Medial Ligaments: The anterior and posterior bands appear intact.    Lateral Ligaments: The radial collateral ligament, annular ligament,  and lateral ulnar collateral ligament appear intact. Radiocapitellar  alignment appears within normal limits.    Common Flexor Tendon: Unremarkable. No tendinosis or tear is  identified.    Common Extensor Tendon: Unremarkable. No tendinosis or tear is  identified.    Biceps and Triceps Tendons: Unremarkable. No tendinosis or tear is  identified.    Joint space: There is a physiologic amount of fluid in the elbow  joint.    Additional Findings: Anteromedial subcutaneous edema is noted in the  distal aspect of the arm on the proximal edge of the scan. The cubital  tunnel and ulnar nerve appear within normal limits. No muscle edema or  atrophy. No evidence of olecranon or bicipitoradial bursitis       Impression    IMPRESSION:   1. Nonspecific subcutaneous edema along the anteromedial aspect of the  distal arm on the edge of the scan. This is of indeterminate etiology.  2. No evidence of distal biceps tendinosis or tear.    SELVIN CHOU MD         Assessment:  1. Elbow injury, left, subsequent encounter        Plan:  Discussed the assessment with the  patient and developed a plan together:  -Reviewed MRI imaging with Ilir.  No evidence of distal biceps tendinosis or tear.  He is complaining of intermittent numbness/tingling in the 3rd-5th digits.  He may have some ulnar nerve irritation related to the swelling from the irritation.    -He would like to return to full work duties.  Cleared for full work duties as tolerated.  Letter provided.  -Ice or heat 15-20 minutes as needed (Avoid sleeping on a heating pad or ice).  -Patient's preferred over the counter medications as directed on packaging as needed for pain or soreness.  Please take ibuprofen with food. Do not premedicate prior to activity.    Follow up as needed if symptoms fail to improve or worsen. Please call with any questions or concerns.     Ester Treviño MD, CAQ  [facility]

## 2018-04-16 NOTE — LETTER
April 16, 2018      Maco hCen  [address] [place] [address]        To Whom It May Concern:    Maco Chen was seen in our clinic. He may return to work without restrictions beginning 4/17/2018.      Sincerely,        Stephany Treviño MD, CAQ

## 2018-04-16 NOTE — PATIENT INSTRUCTIONS
Today's Plan of Care:  -Work: Full activities as tolerated beginning 4/17/2018. Letter provided.  -Ice or heat 15-20 minutes as needed (Avoid sleeping on a heating pad or ice)  -Patient's preferred over the counter medications as directed on packaging as needed for pain or soreness.  Please take ibuprofen with food. Do not premedicate prior to activity.    Follow Up: As needed if symptoms fail to improve or worsen. Please call with any questions or concerns.

## 2018-04-16 NOTE — LETTER
4/16/2018         RE: Maco Chen  [address]  [facility] [address]        Dear Colleague,    Thank you for referring your patient, Maco Chen, to the [facility]. Please see a copy of my visit note below.    Sports Medicine Clinic Visit - Interim History April 16, 2018    Initial Visit Date 4/10/2018  Initial Injury Date 4/4/2018    PCP: Dayana Hannon    Maco Chen is a 27 year old male who is seen in follow up for a left elbow injury. Since last visit on 4/10/2018 patient has been doing well. The pain is better. He is continuing to have weakness through the forearm and middle to pinky finger. He rates the pain at a 2/10 currently.  Symptoms are relieved with rest.  Symptoms are worsened by holding his child (25 lbs).     - Now ~ 12 days from initial injury      Review of Systems  Musculoskeletal: as above  Remainder of review of systems is negative including constitutional, eyes, ENT, CV, pulmonary, GI, , endocrine, skin, hematologic, and neurologic except as noted in HPI or medical history.    History reviewed. No pertinent past surgical/medical/family/social history other than as mentioned in HPI.    Past Medical History:   Diagnosis Date     Hypertrophy of tonsils alone      Obesity, unspecified      Oppositional defiant disorder of childhood or adolescence      Sleep disturbance, unspecified      Past Surgical History:   Procedure Laterality Date     HC REMOVAL PREPATELLA BURSA  05/27/10     TONSILLECTOMY & ADENOIDECTOMY  3/21/2006     Family History   Problem Relation Age of Onset     CANCER Maternal Grandfather      throat cancer     Allergies Brother      Allergies Sister      Social History     Social History     Marital status:      Spouse name: N/A     Number of children: N/A     Years of education: N/A     Occupational History     student Student     Social History Main Topics     Smoking status: Current Every Day Smoker      Packs/day: 1.00     Years: 5.00     Types: Cigarettes     Smokeless tobacco: Never Used     Alcohol use 0.0 oz/week     0 Standard drinks or equivalent per week      Comment: rare     Drug use: No     Sexual activity: Yes     Partners: Female     Other Topics Concern      Service Not Asked     NA     Blood Transfusions No     Caffeine Concern No     Occupational Exposure No     Hobby Hazards No     Sleep Concern Yes     Stress Concern No     Weight Concern Yes     Special Diet No     Back Care No     Exercise Yes     Bike Helmet No     Seat Belt Yes     Self-Exams No     Social History Narrative       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     Current Outpatient Prescriptions   Medication     HYDROcodone-acetaminophen (NORCO) 5-325 MG per tablet     No current facility-administered medications for this visit.      Allergies   Allergen Reactions     Tessalon [Benzonatate] Itching, Swelling and Rash     Red flushed face, hives to face, ears and jaw area very swollen       Zithromax [Azithromycin Dihydrate] Itching, Swelling and Rash     Red flushed face, hives to face, ears and jaw area very swollen         Objective:  /84  Ht 6' (1.829 m)  Wt 300 lb (136.1 kg)  BMI 40.69 kg/m2    General: Alert and in no distress    Head: Normocephalic, atraumatic  Eyes: no scleral icterus or conjunctival erythema   Oropharynx:  Mucous membranes moist  Skin: no erythema, petechiae, or jaundice  CV: regular rhythm by palpation, 2+ distal pulses  Resp: normal respiratory effort without conversational dyspnea   Psych: normal mood and affect    Gait: Non-antalgic, appropriate coordination and balance   Neuro: Motor strength and sensation as noted below    Musculoskeletal:    Bilateral Elbow exam:    Inspection: Yellowish bruising left proximal medial elbow    Palpation:  Slightly tender over the bruise as above    ROM:      full with flexion, extension, forearm supination and pronation bilaterally    Strength: 5/5 bilateral elbow  flexion/extension, forearm supination/pronation, wrist flexion/extension,  strength, finger abduction    Sensation: Intact to light touch in the bilateral upper limbs      Radiology:  Independent visualization of images performed  Recent Results (from the past 744 hour(s))   XR Elbow Left G/E 3 Views    Narrative    LEFT ELBOW THREE OR MORE VIEWS   4/10/2018 11:23 AM     HISTORY:  Left elbow pain.    COMPARISON: None.      Impression    IMPRESSION: No joint effusion. No acute fracture or subluxation. Joint  spaces are well-preserved.    MAGALY MARISCAL MD   MR Elbow Left w/o Contrast    Narrative    MR ELBOW LEFT WITHOUT CONTRAST April 12, 2018 9:13 AM    HISTORY: Left elbow injury, concern for distal biceps tendon tear.  Left elbow pain.    TECHNIQUE: Coronal T1, STIR, GRE.  Axial T1 and T2.  Sagittal STIR.     FINDINGS:   Osseous and Cartilaginous Structures: Unremarkable. No fracture, bone  contusion or chondromalacia identified. No capitellar osteochondral  lesion identified.    Medial Ligaments: The anterior and posterior bands appear intact.    Lateral Ligaments: The radial collateral ligament, annular ligament,  and lateral ulnar collateral ligament appear intact. Radiocapitellar  alignment appears within normal limits.    Common Flexor Tendon: Unremarkable. No tendinosis or tear is  identified.    Common Extensor Tendon: Unremarkable. No tendinosis or tear is  identified.    Biceps and Triceps Tendons: Unremarkable. No tendinosis or tear is  identified.    Joint space: There is a physiologic amount of fluid in the elbow  joint.    Additional Findings: Anteromedial subcutaneous edema is noted in the  distal aspect of the arm on the proximal edge of the scan. The cubital  tunnel and ulnar nerve appear within normal limits. No muscle edema or  atrophy. No evidence of olecranon or bicipitoradial bursitis       Impression    IMPRESSION:   1. Nonspecific subcutaneous edema along the anteromedial aspect of  the  distal arm on the edge of the scan. This is of indeterminate etiology.  2. No evidence of distal biceps tendinosis or tear.    SELVIN CHOU MD         Assessment:  1. Elbow injury, left, subsequent encounter        Plan:  Discussed the assessment with the patient and developed a plan together:  -Reviewed MRI imaging with Ilir.  No evidence of distal biceps tendinosis or tear.  He is complaining of intermittent numbness/tingling in the 3rd-5th digits.  He may have some ulnar nerve irritation related to the swelling from the irritation.    -He would like to return to full work duties.  Cleared for full work duties as tolerated.  Letter provided.  -Ice or heat 15-20 minutes as needed (Avoid sleeping on a heating pad or ice).  -Patient's preferred over the counter medications as directed on packaging as needed for pain or soreness.  Please take ibuprofen with food. Do not premedicate prior to activity.    Follow up as needed if symptoms fail to improve or worsen. Please call with any questions or concerns.     Ester Treviño MD, [facility]        Again, thank you for allowing me to participate in the care of your patient.        Sincerely,        Stephany Treviño MD

## 2018-04-16 NOTE — MR AVS SNAPSHOT
"              After Visit Summary   4/16/2018    Maco Chen    MRN: [id number]           Patient Information     Date Of Birth          1990        Visit Information        Provider Department      4/16/2018 11:40 AM Stephany Treviño MD [facility]        Today's Diagnoses     Left elbow pain    -  1      Care Instructions    Today's Plan of Care:  -Work: Full activities as tolerated beginning 4/17/2018. Letter provided.  -Ice or heat 15-20 minutes as needed (Avoid sleeping on a heating pad or ice)  -Patient's preferred over the counter medications as directed on packaging as needed for pain or soreness.  Please take ibuprofen with food. Do not premedicate prior to activity.    Follow Up: As needed if symptoms fail to improve or worsen. Please call with any questions or concerns.               Follow-ups after your visit        Who to contact     If you have questions or need follow up information about today's clinic visit or your schedule please contact [facility] directly at [phone].  Normal or non-critical lab and imaging results will be communicated to you by [email or web address], letter or phone within 4 business days after the clinic has received the results. If you do not hear from us within 7 days, please contact the clinic through EeBriat or phone. If you have a critical or abnormal lab result, we will notify you by phone as soon as possible.  Submit refill requests through Chicisimo or call your pharmacy and they will forward the refill request to us. Please allow 3 business days for your refill to be completed.          Additional Information About Your Visit        [email or web address] Information     Chicisimo lets you send messages to your doctor, view your test results, renew your prescriptions, schedule appointments and more. To sign up, go to [email or web address] . Click on \"Log in\" on the left side of the screen, which will take you to the Welcome page. Then " "click on \"Sign up Now\" on the right side of the page.     You will be asked to enter the access code listed below, as well as some personal information. Please follow the directions to create your username and password.     Your access code is: [email or web address]  Expires: 2018 10:18 AM     Your access code will  in 90 days. If you need help or a new code, please call your [place] clinic or [phone].        Care EveryWhere ID     This is your Care EveryWhere ID. This could be used by other organizations to access your [place] medical records  [id number]        Your Vitals Were     Height BMI (Body Mass Index)                6' (1.829 m) 40.69 kg/m2           Blood Pressure from Last 3 Encounters:   18 132/84   04/10/18 134/84   18 164/82    Weight from Last 3 Encounters:   18 300 lb (136.1 kg)   04/10/18 300 lb (136.1 kg)   18 293 lb 11.2 oz (133.2 kg)              Today, you had the following     No orders found for display       Primary Care Provider Office Phone # Fax #    Dayana Hannon PA-C [phone] [address] [facility] [address]        Equal Access to Services     [facility] : Hadii aad ku hadasho Soomaali, waaxda luqadaha, qaybta kaalmada adeegyada, alison moran . So [facility] [phone].    ATENCIÓN: Si habla español, tiene a tavares disposición servicios gratuitos de asistencia lingüística. Clara al [phone].    We comply with applicable federal civil rights laws and [place] laws. We do not discriminate on the basis of race, color, national origin, age, disability, sex, sexual orientation, or gender identity.            Thank you!     Thank you for choosing [facility]  for your care. Our goal is always to provide you with excellent care. Hearing back from our patients is one way we can continue to improve our services. Please take a few minutes to complete the written survey that you may " receive in the mail after your visit with us. Thank you!             Your Updated Medication List - Protect others around you: Learn how to safely use, store and throw away your medicines at www.disposemymeds.org.          This list is accurate as of 4/16/18 12:23 PM.  Always use your most recent med list.                   Brand Name Dispense Instructions for use Diagnosis    HYDROcodone-acetaminophen 5-325 MG per tablet    NORCO    20 tablet    Take 1-2 tablets by mouth every 8 hours as needed for moderate to severe pain    Left elbow pain

## 2018-07-03 ENCOUNTER — OFFICE VISIT (OUTPATIENT)
Dept: FAMILY MEDICINE | Facility: OTHER | Age: 28
End: 2018-07-03
Payer: COMMERCIAL

## 2018-07-03 VITALS
HEART RATE: 100 BPM | WEIGHT: 283.7 LBS | RESPIRATION RATE: 20 BRPM | BODY MASS INDEX: 38.48 KG/M2 | DIASTOLIC BLOOD PRESSURE: 76 MMHG | TEMPERATURE: 98.7 F | SYSTOLIC BLOOD PRESSURE: 138 MMHG

## 2018-07-03 DIAGNOSIS — M23.91 INTERNAL DERANGEMENT OF KNEE, RIGHT: Primary | ICD-10-CM

## 2018-07-03 PROCEDURE — 99213 OFFICE O/P EST LOW 20 MIN: CPT | Performed by: FAMILY MEDICINE

## 2018-07-03 ASSESSMENT — PAIN SCALES - GENERAL: PAINLEVEL: SEVERE PAIN (6)

## 2018-07-03 NOTE — MR AVS SNAPSHOT
After Visit Summary   7/3/2018    Maco Chen    MRN: [id number]           Patient Information     Date Of Birth          1990        Visit Information        Provider Department      7/3/2018 2:00 PM Binh Brock MD [facility]        Today's Diagnoses     Internal derangement of knee, right    -  1       Follow-ups after your visit        Additional Services     ORTHOPEDICS ADULT REFERRAL       Your provider has referred you to:[facility]    Please be aware that coverage of these services is subject to the terms and limitations of your health insurance plan.  Call member services at your health plan with any benefit or coverage questions.      Please bring the following to your appointment:    >>   Any x-rays, CTs or MRIs which have been performed.  Contact the facility where they were done to arrange for  prior to your scheduled appointment.    >>   List of current medications   >>   This referral request   >>   Any documents/labs given to you for this referral                  Your next 10 appointments already scheduled     Jul 06, 2018  8:40 AM CDT   New Visit with Brittany Orantes MD   [facility]    [address] [place] [id number] [phone]              Who to contact     If you have questions or need follow up information about today's clinic visit or your schedule please contact [facility] directly at [phone].  Normal or non-critical lab and imaging results will be communicated to you by MyChart, letter or phone within 4 business days after the clinic has received the results. If you do not hear from us within 7 days, please contact the clinic through MyChart or phone. If you have a critical or abnormal lab result, we will notify you by phone as soon as possible.  Submit refill requests through Zettics or call your pharmacy and they will forward the refill request to us.  Please allow 3 business days for your refill to be completed.          Additional Information About Your Visit        Care EveryWhere ID     This is your Care EveryWhere ID. This could be used by other organizations to access your [facility] medical records  [id number]        Your Vitals Were     Pulse Temperature Respirations BMI (Body Mass Index)          100 98.7  F (37.1  C) (Temporal) 20 38.48 kg/m2         Blood Pressure from Last 3 Encounters:   07/03/18 138/76   04/16/18 124/84   04/10/18 134/84    Weight from Last 3 Encounters:   07/03/18 283 lb 11.2 oz (128.7 kg)   04/16/18 300 lb (136.1 kg)   04/10/18 300 lb (136.1 kg)              We Performed the Following     ORTHOPEDICS ADULT REFERRAL        Primary Care Provider Fax #    Physician No Ref-Primary [phone]       No address on file        Equal Access to Services     [facility] CAR[facility] : Tonia Gardner, joyce vance, bharath patrick, alison moran . So [facility] [phone].    ATENCIÓN: Si habla español, tiene a tavares disposición servicios gratuitos de asistencia lingüística. Clara al [phone].    We comply with applicable federal civil rights laws and [place] laws. We do not discriminate on the basis of race, color, national origin, age, disability, sex, sexual orientation, or gender identity.            Thank you!     Thank you for choosing [facility]  for your care. Our goal is always to provide you with excellent care. Hearing back from our patients is one way we can continue to improve our services. Please take a few minutes to complete the written survey that you may receive in the mail after your visit with us. Thank you!             Your Updated Medication List - Protect others around you: Learn how to safely use, store and throw away your medicines at www.disposemymeds.org.      Notice  As of 7/3/2018  2:38 PM    You have not been prescribed any medications.
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
## 2018-07-06 ENCOUNTER — RADIANT APPOINTMENT (OUTPATIENT)
Dept: GENERAL RADIOLOGY | Facility: CLINIC | Age: 28
End: 2018-07-06
Attending: ORTHOPAEDIC SURGERY
Payer: COMMERCIAL

## 2018-07-06 ENCOUNTER — OFFICE VISIT (OUTPATIENT)
Dept: ORTHOPEDICS | Facility: CLINIC | Age: 28
End: 2018-07-06
Payer: COMMERCIAL

## 2018-07-06 VITALS
SYSTOLIC BLOOD PRESSURE: 152 MMHG | DIASTOLIC BLOOD PRESSURE: 82 MMHG | BODY MASS INDEX: 38.33 KG/M2 | HEIGHT: 72 IN | WEIGHT: 283 LBS

## 2018-07-06 DIAGNOSIS — M25.561 RIGHT KNEE PAIN: ICD-10-CM

## 2018-07-06 DIAGNOSIS — M71.21 POPLITEAL CYST, RIGHT: Primary | ICD-10-CM

## 2018-07-06 PROCEDURE — 99204 OFFICE O/P NEW MOD 45 MIN: CPT | Performed by: ORTHOPAEDIC SURGERY

## 2018-07-06 PROCEDURE — 73562 X-RAY EXAM OF KNEE 3: CPT | Mod: TC

## 2018-07-06 RX ORDER — MELOXICAM 15 MG/1
15 TABLET ORAL DAILY
Qty: 60 TABLET | Refills: 1 | Status: SHIPPED | OUTPATIENT
Start: 2018-07-06 | End: 2018-07-12

## 2018-07-06 ASSESSMENT — PAIN SCALES - GENERAL: PAINLEVEL: MODERATE PAIN (5)

## 2018-07-06 NOTE — PROGRESS NOTES
ORTHOPEDIC CONSULT      Chief Complaint: Maco Chen is a 27 year old male who works as a .  Patient also has 2 kids one is 1-1/2 and the other is 2-1/2.  A boy and a girl.  He enjoys walking with them.    He is being seen for   Chief Complaints and History of Present Illnesses   Patient presents with     Consult     rt knee         History of Present Illness:   Maco Chen is a 27 year old male who is seen in consultation at the request of Dr. Brock  History of Present illness:  Maco presents for evaluation of:  1.) rt knee, patient states that the pain is on the back of the knee.  Onset: 1-2 months.  Patient denies any injury fall or trauma  Symptoms brought on by:  Pt had surgery about 10 yrs. ago.  His surgery was bursectomy of the right knee and it was done about 10 years ago and he thinks it was done by Dr. Franks  Character:  dull ache, stabbing, burning and swelling.    Progression of symptoms:  worse.    Previous similar pain: YES.  Patient feels that the swelling that he gets on his right knee now is not the same as the swelling he used to get in his bursa.  Pain Level:  5/10.   Previous treatments:  rest/inactivity, ice, support wrap, acetaminophen and Ibuprofen.  Patient feels that rest and ice and elevation is helping him.  He feels that the Tylenol and the ibuprofen does not do not help all that much.  He is taking 800 mg of the ibuprofen twice a day with food.  Patient does feel his old knee brace does help him but it is getting old and falling apart.  Patient does use an Ace wrap at times which helps with the swelling slightly.  Patient has not had any formal physical therapy or any cortisone injections.  Currently on Blood thinners? No  Diagnosis of Diabetes? No    Additional History: At times the patient feels his knee is going to give out.  He denies any major catching or locking.  Patient denies any patella dislocation type symptoms.  Patient  denies any numbness or tingling or shooting or radicular symptoms.  Patient is noticing his pain mostly at night when he goes to bed.  Patient does not feel increased activity makes it worse.    Patient's past medical, surgical, social and family histories reviewed.     Past Medical History:   Diagnosis Date     Hypertrophy of tonsils alone      Obesity, unspecified      Oppositional defiant disorder of childhood or adolescence      Sleep disturbance, unspecified          Past Surgical History:   Procedure Laterality Date     HC REMOVAL PREPATELLA BURSA  05/27/10     TONSILLECTOMY & ADENOIDECTOMY  3/21/2006       Medications:    No current outpatient prescriptions on file prior to visit.  No current facility-administered medications on file prior to visit.     Allergies   Allergen Reactions     Tessalon [Benzonatate] Itching, Swelling and Rash     Red flushed face, hives to face, ears and jaw area very swollen       Zithromax [Azithromycin Dihydrate] Itching, Swelling and Rash     Red flushed face, hives to face, ears and jaw area very swollen       Social History     Occupational History     student Student     Social History Main Topics     Smoking status: Current Every Day Smoker     Packs/day: 1.00     Years: 5.00     Types: Cigarettes     Smokeless tobacco: Never Used     Alcohol use 0.0 oz/week     0 Standard drinks or equivalent per week      Comment: rare     Drug use: No     Sexual activity: Yes     Partners: Female       Family History   Problem Relation Age of Onset     Cancer Maternal Grandfather      throat cancer     Allergies Brother      Allergies Sister      REVIEW OF SYSTEMS  10 point review systems performed otherwise negative as noted as per history of present illness.    Physical Exam:  Vitals: /82  Ht 1.829 m (6')  Wt 128.4 kg (283 lb)  BMI 38.38 kg/m2  BMI= Body mass index is 38.38 kg/(m^2).    Constitutional: healthy, alert and no acute distress   Psychiatric: mentation appears  normal and affect normal/bright  NEURO: no focal deficits, CMS intact right lower extremity  RESP: Normal with easy respirations and no use of accessory muscles to breathe, no audible wheezing or retractions  CV: Calf soft and nontender to palpation, leg warm   SKIN: No erythema, rashes, excoriation, or breakdown. No evidence of infection.  We do see a well-healed midline incision.  MUSCULOSKELETAL:    INSPECTION of right knee: No gross deformities, erythema, edema, ecchymosis, atrophy or fasciculations.     PALPATION: No tenderness on palpation of the medial, lateral, anterior and posterior portion of the knee. No specific joint line tenderness. No increased warmth.  No effusion.     ROM: Extension full, flexion to approximately 125 . All range of motion without catching, locking or pain.       STRENGTH: 5 out of 5 quad and hamstring strength.     SPECIAL TEST: Patient has a negative Lachman's negative drawer sign. Patient's knee is stable to varus and valgus stress at 30  of flexion. Patient has a negative Angel's.   GAIT: non-antalgic  Lymph: no palpable lymph nodes    Diagnostic Modalities:  X-rays done today 3 views of the right knee AP lateral and sunrise view showing no fracture no dislocation no tumor and no degenerative joint disease.  Possibly on the sunrise view the patella looks like it sitting slightly lateral but this is an aggressive call.    Independent visualization of the images was performed.    Impression: 1.  Right knee popliteal cyst    Plan:  All of the above pertinent physical exam and imaging modalities findings was reviewed with Maco.                                          CONSERVATIVE CARE:    Patient Instructions:  1. You have a cyst in the back of your knee. This is called a baker's cyst or popliteal cyst.  This is joint fluid that leaks out from the joint and gets stuck in the back of the knee.    2. The swelling will re absorb over time.  We do not put a needle in the back  of your knee because there are major nerves and vessels there.   3. It sometimes can take 5 months for the swelling to go down.  4. Do activity as tolerated.  5. We added information on the Baker's Cyst below.  6.  X-ray: Your x-rays look excellent with no major arthritis or fracture dislocation or tumor.  7. I ordered Mobic 15mg once a day times 2 weeks, then take as needed.  Stop this medication if you have any abdominal pain with it and do not take NSAIDs like Ibuprofen or Aleve while on this medication.  I sent this to your pharmacy.        8. Its possible that when you kicked the Taumatropo Animation and had some inside knee pain, you may have torn your meniscus at that time a little.  This could be a one way valve for the cyst.  9. We discussed a MRI study that may give us more information but we decided to hold off on that today.    10.  Brace: We decided to get you a new brace today we showed you a few.  Hopefully this will work better than your old knee brace.  11.  We wish we could do more about these Baker's cyst but they are in the back of the knee where the femoral nerve and femoral artery and vein are and it is a dangerous area to work.  Your body should reabsorb it over time.  12. We talked about formal physical therapy, but decided to hold for now.  I have exercises below that you can try.   13.  Follow up with Brittany Orantes MD and/or Jude Singh PA-C on an as needed basis.   Re-x-ray on return: No    BP Readings from Last 1 Encounters:   07/06/18 152/82       BP noted to be elevated today in office.  Patient to follow up with Primary Care provider regarding elevated blood pressure.     Patient does use Tobacco products. Patient not ready to quit at this time.  Strongly encouraged smoking cessation.  Risks of smoking and benefits of quitting were discussed.  Information offered: AVS with information about stopping smoking and advised to discuss smoking cessation medications/strategies with Primary care  provider.  3-5 Minutes were spent in counseling.    Scribed by Jude Singh PA-C on 7/6/2018 at 9:40 AM, based on Dr. Brittany Orantes's statements to me.    This note was dictated with [facility].    KAUSHIK Palacios MD

## 2018-07-06 NOTE — LETTER
7/6/2018         RE: Maco Chen  [address]  [facility] [address]        Dear Colleague,    Thank you for referring your patient, Maco Chen, to the [facility]. Please see a copy of my visit note below.    Maco Chen is a 27 year old male who is seen in consultation at the request of Dr. Brock  History of Present illness:  Maco presents for evaluation of:  1.) rt knee  Onset: 1-2 months  Symptoms brought on by:  Pt had surgery about 10 yrs. ago  Character:  dull ache, stabbing, burning and swelling.    Progression of symptoms:  worse.    Previous similar pain: YES.   Pain Level:  5/10.   Previous treatments:  rest/inactivity, ice, support wrap, acetaminophen and Ibuprofen.  Currently on Blood thinners? No  Diagnosis of Diabetes? No            ORTHOPEDIC CONSULT      Chief Complaint: Maco Chen is a 27 year old male who works as a .  Patient also has 2 kids one is 1-1/2 and the other is 2-1/2.  A boy and a girl.  He enjoys walking with them.    He is being seen for   Chief Complaints and History of Present Illnesses   Patient presents with     Consult     rt knee         History of Present Illness:   Maco Chen is a 27 year old male who is seen in consultation at the request of Dr. Brock  History of Present illness:  Maco presents for evaluation of:  1.) rt knee, patient states that the pain is on the back of the knee.  Onset: 1-2 months.  Patient denies any injury fall or trauma  Symptoms brought on by:  Pt had surgery about 10 yrs. ago.  His surgery was bursectomy of the right knee and it was done about 10 years ago and he thinks it was done by Dr. Franks  Character:  dull ache, stabbing, burning and swelling.    Progression of symptoms:  worse.    Previous similar pain: YES.  Patient feels that the swelling that he gets on his right knee now is not the same as the swelling he used to get in his  bursa.  Pain Level:  5/10.   Previous treatments:  rest/inactivity, ice, support wrap, acetaminophen and Ibuprofen.  Patient feels that rest and ice and elevation is helping him.  He feels that the Tylenol and the ibuprofen does not do not help all that much.  He is taking 800 mg of the ibuprofen twice a day with food.  Patient does feel his old knee brace does help him but it is getting old and falling apart.  Patient does use an Ace wrap at times which helps with the swelling slightly.  Patient has not had any formal physical therapy or any cortisone injections.  Currently on Blood thinners? No  Diagnosis of Diabetes? No    Additional History: At times the patient feels his knee is going to give out.  He denies any major catching or locking.  Patient denies any patella dislocation type symptoms.  Patient denies any numbness or tingling or shooting or radicular symptoms.  Patient is noticing his pain mostly at night when he goes to bed.  Patient does not feel increased activity makes it worse.    Patient's past medical, surgical, social and family histories reviewed.     Past Medical History:   Diagnosis Date     Hypertrophy of tonsils alone      Obesity, unspecified      Oppositional defiant disorder of childhood or adolescence      Sleep disturbance, unspecified          Past Surgical History:   Procedure Laterality Date     HC REMOVAL PREPATELLA BURSA  05/27/10     TONSILLECTOMY & ADENOIDECTOMY  3/21/2006       Medications:    No current outpatient prescriptions on file prior to visit.  No current facility-administered medications on file prior to visit.     Allergies   Allergen Reactions     Tessalon [Benzonatate] Itching, Swelling and Rash     Red flushed face, hives to face, ears and jaw area very swollen       Zithromax [Azithromycin Dihydrate] Itching, Swelling and Rash     Red flushed face, hives to face, ears and jaw area very swollen       Social History     Occupational History     student Student      Social History Main Topics     Smoking status: Current Every Day Smoker     Packs/day: 1.00     Years: 5.00     Types: Cigarettes     Smokeless tobacco: Never Used     Alcohol use 0.0 oz/week     0 Standard drinks or equivalent per week      Comment: rare     Drug use: No     Sexual activity: Yes     Partners: Female       Family History   Problem Relation Age of Onset     Cancer Maternal Grandfather      throat cancer     Allergies Brother      Allergies Sister      REVIEW OF SYSTEMS  10 point review systems performed otherwise negative as noted as per history of present illness.    Physical Exam:  Vitals: /82  Ht 1.829 m (6')  Wt 128.4 kg (283 lb)  BMI 38.38 kg/m2  BMI= Body mass index is 38.38 kg/(m^2).    Constitutional: healthy, alert and no acute distress   Psychiatric: mentation appears normal and affect normal/bright  NEURO: no focal deficits, CMS intact right lower extremity  RESP: Normal with easy respirations and no use of accessory muscles to breathe, no audible wheezing or retractions  CV: Calf soft and nontender to palpation, leg warm   SKIN: No erythema, rashes, excoriation, or breakdown. No evidence of infection.  We do see a well-healed midline incision.  MUSCULOSKELETAL:    INSPECTION of right knee: No gross deformities, erythema, edema, ecchymosis, atrophy or fasciculations.     PALPATION: No tenderness on palpation of the medial, lateral, anterior and posterior portion of the knee. No specific joint line tenderness. No increased warmth.  No effusion.     ROM: Extension full, flexion to approximately 125 . All range of motion without catching, locking or pain.       STRENGTH: 5 out of 5 quad and hamstring strength.     SPECIAL TEST: Patient has a negative Lachman's negative drawer sign. Patient's knee is stable to varus and valgus stress at 30  of flexion. Patient has a negative Angel's.   GAIT: non-antalgic  Lymph: no palpable lymph nodes    Diagnostic Modalities:  X-rays done  today 3 views of the right knee AP lateral and sunrise view showing no fracture no dislocation no tumor and no degenerative joint disease.  Possibly on the sunrise view the patella looks like it sitting slightly lateral but this is an aggressive call.    Independent visualization of the images was performed.    Impression: 1.  Right knee popliteal cyst    Plan:  All of the above pertinent physical exam and imaging modalities findings was reviewed with Maco.                                          CONSERVATIVE CARE:    Patient Instructions:  1. You have a cyst in the back of your knee. This is called a baker's cyst or popliteal cyst.  This is joint fluid that leaks out from the joint and gets stuck in the back of the knee.    2. The swelling will re absorb over time.  We do not put a needle in the back of your knee because there are major nerves and vessels there.   3. It sometimes can take 5 months for the swelling to go down.  4. Do activity as tolerated.  5. We added information on the Baker's Cyst below.  6.  X-ray: Your x-rays look excellent with no major arthritis or fracture dislocation or tumor.  7. I ordered Mobic 15mg once a day times 2 weeks, then take as needed.  Stop this medication if you have any abdominal pain with it and do not take NSAIDs like Ibuprofen or Aleve while on this medication.  I sent this to your pharmacy.        8. Its possible that when you kicked the windshield and had some inside knee pain, you may have torn your meniscus at that time a little.  This could be a one way valve for the cyst.  9. We discussed a MRI study that may give us more information but we decided to hold off on that today.    10.  Brace: We decided to get you a new brace today we showed you a few.  Hopefully this will work better than your old knee brace.  11.  We wish we could do more about these Baker's cyst but they are in the back of the knee where the femoral nerve and femoral artery and vein are and  it is a dangerous area to work.  Your body should reabsorb it over time.  12. We talked about formal physical therapy, but decided to hold for now.  I have exercises below that you can try.   13.  Follow up with Brittany Orantes MD and/or Jude Singh PA-C on an as needed basis.   Re-x-ray on return: No    BP Readings from Last 1 Encounters:   07/06/18 152/82       BP noted to be elevated today in office.  Patient to follow up with Primary Care provider regarding elevated blood pressure.     Patient does use Tobacco products. Patient not ready to quit at this time.  Strongly encouraged smoking cessation.  Risks of smoking and benefits of quitting were discussed.  Information offered: AVS with information about stopping smoking and advised to discuss smoking cessation medications/strategies with Primary care provider.  3-5 Minutes were spent in counseling.    Scribed by Jude Singh PA-C on 7/6/2018 at 9:40 AM, based on Dr. Brittany Orantes's statements to me.    This note was dictated with APTwateron Mango.    KAUSHIK Palacios MD      Again, thank you for allowing me to participate in the care of your patient.        Sincerely,        Brittany Orantes MD

## 2018-07-06 NOTE — PROGRESS NOTES
Maco Chen is a 27 year old male who is seen in consultation at the request of Dr. Brock  History of Present illness:  Maco presents for evaluation of:  1.) rt knee  Onset: 1-2 months  Symptoms brought on by:  Pt had surgery about 10 yrs. ago  Character:  dull ache, stabbing, burning and swelling.    Progression of symptoms:  worse.    Previous similar pain: YES.   Pain Level:  5/10.   Previous treatments:  rest/inactivity, ice, support wrap, acetaminophen and Ibuprofen.  Currently on Blood thinners? No  Diagnosis of Diabetes? No

## 2018-07-06 NOTE — MR AVS SNAPSHOT
After Visit Summary   7/6/2018    Maco Chen    MRN: [id number]           Patient Information     Date Of Birth          1990        Visit Information        Provider Department      7/6/2018 8:40 AM Brittany Orantes MD [facility]        Today's Diagnoses     Popliteal cyst, right    -  1      Care Instructions      Encounter Diagnosis   Name Primary?     Popliteal cyst, right Yes      Rest, ice and elevate above heart level as needed for pain control  1. You have a cyst in the back of your knee. This is called a baker's cyst or popliteal cyst.  This is joint fluid that leaks out from the joint and gets stuck in the back of the knee.    2. The swelling will re absorb over time.  We do not put a needle in the back of your knee because there are major nerves and vessels there.   3. It sometimes can take 5 months for the swelling to go down.  4. Do activity as tolerated.  5. We added information on the Baker's Cyst below.  6.  X-ray: Your x-rays look excellent with no major arthritis or fracture dislocation or tumor.  7. I ordered Mobic 15mg once a day times 2 weeks, then take as needed.  Stop this medication if you have any abdominal pain with it and do not take NSAIDs like Ibuprofen or Aleve while on this medication.  I sent this to your pharmacy.        8. Its possible that when [facility] and had some inside knee pain, you may have torn your meniscus at that time a little.  This could be a one way valve for the cyst.  9. We discussed a MRI study that may give us more information but we decided to hold off on that today.    10.  Brace: We decided to get you a new brace today we showed you a few.  Hopefully this will work better than your old knee brace.  11.  We wish we could do more about these Baker's cyst but they are in the back of the knee where the femoral nerve and femoral artery and vein are and it is a dangerous area to work.  Your body  should reabsorb it over time.  12. We talked about formal physical therapy, but decided to hold for now.  I have exercises below that you can try.   13.  Follow up with Brittany Orantes MD and/or Jude Singh PA-C on an as needed basis.   Hamstring Stretch (Flexibility)    1. Sit on the floor with your right leg straight in front of you. Bend your left leg so the sole of your foot rests against the inside of your right thigh.  2. Reach toward your ankle. Keep your knee, neck, and back straight. Feel the stretch in the back of your thigh.  3. Hold for 30 to 60 seconds. Repeat 2 times, or as instructed.  4. Switch legs and repeat.  5. Repeat this exercise 3 times per day, or as instructed.      Tip: Don t bounce while you re stretching.   Date Last Reviewed: 3/10/2016    8842-6705 [employer]. [address] [place] [address]. All rights reserved. This information is not intended as a substitute for professional medical care. Always follow your healthcare professional's instructions.       Quadriceps Stretch (Flexibility)    6. Stand up straight and hold onto a wall, sturdy chair, railing, or table with your right hand.  7. Bend your left leg at the knee behind you, and grab your ankle with your left hand. Pull your left heel toward your buttocks. Don t arch your back.  8. Hold for 30 to 60 seconds. Repeat 2 times.  9. Switch legs and repeat.  Date Last Reviewed: 5/1/2016 2000-2017 [employer]. [address] [place] [address]. All rights reserved. This information is not intended as a substitute for professional medical care. Always follow your healthcare professional's instructions.      Wall Squats for ACL Healing    After you regain muscle control, it s time to build strength. This helps you put full weight on your leg. For best results, warm up and stretch before starting. If your injury is recent, wait until swelling and pain decrease before doing this  exercise:    Lean against a wall with your feet hip-width apart. Your feet should be about 18 inches from the wall.    Slowly slide down to a near-sitting position. Don t let your knees go past 90 degrees.    Hold for 10 seconds, then slide back up.    Repeat 5 times.     CAUTION: Do this exercise only if your healthcare provider says it s OK.     2814-3043 [employer]. [address] [place] [address]. All rights reserved. This information is not intended as a substitute for professional medical care. Always follow your healthcare professional's instructions.        Leg and Knee Exercises: Leg Raise    This exercise is designed to stretch and strengthen your knee. Before beginning, read through all the instructions. While exercising, breathe normally and use smooth movements. If you feel any pain, stop the exercise. If pain persists, call your healthcare provider.  Caution    Don t arch your back.    Don t hunch your shoulders.     Sit on the floor with your_________ leg straight, the other bent.    Tighten the thigh muscles on the top of your straight leg. You should feel the muscles contract. Raise that leg 6-8 inches. Then lower it slowly and steadily to the floor. Relax.    Repeat ______ times.  Do ______ sets a day.    9446-0017 [employer]. [address] [place] [address]. All rights reserved. This information is not intended as a substitute for professional medical care. Always follow your healthcare professional's instructions.        Quad Set for Leg and Knee    This exercise is designed to stretch and strengthen your knee. Before beginning, read through all the instructions. While exercising, breathe normally and use smooth movements. If you feel any pain, stop the exercise. If pain persists, call your healthcare provider.  1.  Sit on the floor with one leg straight, the other bent.  2.  Flex the foot of your straight leg by pointing your toes toward you.  Press the back of your knee into the floor while tightening the muscle on the top of your thigh. Hold for ______ seconds. Then relax.  3.  Repeat ______ times. Do ______ sets a day.  Caution    Don t arch your back.    Don t hunch your shoulders.    2814-6864 [employer]. [address] [place] [address]. All rights reserved. This information is not intended as a substitute for professional medical care. Always follow your healthcare professional's instructions.         [employer] and [employer] may offer reliable information regarding your diagnosis and treatment plan.       Understanding Baker s Cyst (Popliteal Cyst)  A Baker s cyst (popliteal cyst) is a fluid-filled sac that forms behind the knee.  Parts of the knee  The knee is a complex joint that has many parts. The lower end of the thighbone (femur) rotates on the upper end of the shinbone (tibia). There are several small bursae around the knee joint. These are small sacs filled with a special fluid (synovial fluid) that cushions the rest of the joint. Between the bones is a space that also contains this fluid.  What causes a Baker s cyst?  A small bursa sits just below the crease at the back of your knee. When this sac fills with too much fluid, it s called a Baker s cyst. This might happen when an injury or disease causes extra synovial fluid to leak into the bursa from the joint space.  In adults, other problems with the knee joint often cause the Baker s cyst. Injury or a knee disorder can change the normal structure of the knee joint. This can cause a cyst to form.  The synovial fluid inside the joint space may build up as a result of injury or disease. As the pressure builds up, the fluid may bulge into the back of the knee. This can cause the cyst.  Symptoms of a Baker s cyst  A Baker s cyst often doesn t cause symptoms. A cyst will more often be seen on an imaging test, like MRI, done for other reasons. If you do have symptoms,  they may include:    Pain in the back of the knee    Knee stiffness    Sense of swelling or fullness behind the knee, especially when you straighten your leg    A swelling behind the knee that goes away when you bend your knee  These symptoms tend to get worse when standing for a long time, or being active.  Diagnosing a Baker s cyst  Your health care provider will ask you about your medical history and your symptoms. He or she will give you a physical exam, which will include a careful exam of your knee. It s important to make sure your symptoms are caused by a Baker s cyst and not a tumor or a blood clot.  If the cause of your symptoms is not clear, you may have imaging tests, such as:    Ultrasound, to look at the cyst in more detail    X-ray, to get more information about the bones of the joint    MRI, if the diagnosis is still unclear after ultrasound, or if your health care provider is considering surgery    2378-0729 [employer]. [address] [place] [address]. All rights reserved. This information is not intended as a substitute for professional medical care. Always follow your healthcare professional's instructions.        Nieves s Cyst    You have a Baker s cyst. This is a lump or bulge in the back of your knee. It is caused when extra joint fluid flows into a small sac behind the knee. The extra fluid occurs because arthritis or a torn cartilage irritates the knee joint.  A small Nieves s cyst often has no symptoms. A larger cyst can cause knee pain or a feeling of pressure behind your knee when you try to fully straighten or bend that joint. A Baker s cyst can leak, leading fluid to move down into your lower leg. This causes swelling, pain, and redness.  Treatment involves draining the extra fluid. Or medicine may be injected to reduce redness and swelling. If the extra fluid is caused by a torn cartilage, then surgery to repair the cartilage may be the best treatment option. If  arthritis is the cause, and it does not get better with treatment, surgery can be done to remove the cyst.  Home care    If you have knee pain, stay off the affected leg as much as possible until the pain eases.    Apply an ice pack to the painful area for no more than 20 minutes. Do this every 3 to 6 hours for the first 24 to 48 hours. Keep using ice packs 3 to 4 times a day for the next few days, as needed for pain. To make an ice pack, put ice cubes in a sealed zip-lock plastic bag. Wrap the bag in a clean, thin towel or cloth. Never put ice or an ice pack directly on the skin.    If you were given a hook-and-loop knee brace, you may open the brace to apply ice. Unless told otherwise, you may remove the brace to bathe and sleep.    You may use over-the-counter pain medicine to control pain, unless another medicine was prescribed. Talk with your provider before using these medicines if you have chronic liver or kidney disease, or have ever had a stomach ulcer or GI (gastrointestinal) bleeding.       If crutches or a walker have been recommended, don t bear full weight on your injured leg until you can do so without pain. Check with your provider before returning to sports or full work duties.  Follow-up care  Follow up with your healthcare provider within 1 to 2 weeks, or as advised.  If X-rays were taken, you will be notified of any new findings that may affect your care.  When to seek medical advice  Call your healthcare provider right away if any of these occur:    Toes or foot become swollen, cold, blue, numb or tingly    Pain or swelling increases    Warmth or redness appears over the knee    Redness, swelling or pain occurs in the calf or lower leg    2928-3866 [employer]. [address] [place] [address], [place] [address]. All rights reserved. This information is not intended as a substitute for professional medical care. Always follow your healthcare professional's instructions.  [employer] and  [employer] may offer reliable information regarding your diagnosis and treatment plan.    THANK YOU for coming in today. If you receive a survey via [employer] or mail please let us know if there was anything you especially appreciated today or if there is any way we can improve our clinic. We appreciate your input.    GENERAL INFORMATION:  Our hours are:  Monday :     Clinic 7:30 AM-430 PM ([facility])  Tuesday:      Operating Room All Day ([facility])  Wednesday: Clinic 7:30 AM - 11:15 AM ([facility])             Clinic 1:00 PM - 4:00PM ([facility])  Thursday:     Administrative Day  Friday:          Clinic 7:30 AM - 11:15 AM ([facility])            Clinic 1:00 PM - 4:00 PM ([facility])      [facility] for any issues or concerns: [phone]      We are not in the office Thursdays. Therefore non- urgent calls and medical messages received on Thursday will be addressed when we are back in the office on Wednesday. Urgent matters will be reviewed and addressed by one of our partners in the office as needed.    If lab work was done today as part of your evaluation you will generally be contacted via [employer], mail, or phone with the results within 1-5 days. If there is an alarming result we will contact you by phone. Lab results come back at varying times, I generally wait until all labs are resulted before making comments on results. Please note labs are automatically released to [employer] (if you have signed up for it) once available-at times you may see these prior to my having a chance to review them as well.    If you need refills please contact your pharmacist. They will send a refill request to me to review. Please allow 3 business days for us to process all refill requests. All narcotic refills should be handled in the clinic at the time of your visit.           Follow-ups after your visit         Who to contact     If you have questions or need follow up information about today's clinic visit or your schedule please contact [facility] directly at [phone].  Normal or non-critical lab and imaging results will be communicated to you by MyChart, letter or phone within 4 business days after the clinic has received the results. If you do not hear from us within 7 days, please contact the clinic through MyChart or phone. If you have a critical or abnormal lab result, we will notify you by phone as soon as possible.  Submit refill requests through [employer] or call your pharmacy and they will forward the refill request to us. Please allow 3 business days for your refill to be completed.          Additional Information About Your Visit        Care EveryWhere ID     This is your Care EveryWhere ID. This could be used by other organizations to access your [facility] medical records  [id number]        Your Vitals Were     Height BMI (Body Mass Index)                1.829 m (6') 38.38 kg/m2           Blood Pressure from Last 3 Encounters:   07/06/18 152/82   07/03/18 138/76   04/16/18 124/84    Weight from Last 3 Encounters:   07/06/18 128.4 kg (283 lb)   07/03/18 128.7 kg (283 lb 11.2 oz)   04/16/18 136.1 kg (300 lb)               Primary Care Provider Fax #    Physician No Ref-Primary [phone]       No address on file        Equal Access to Services     RADHA JO : Hadii tyler boogie hadzachariaho Sogilberto, waaxda luqadaha, qaybta kaalmada celina, alison moran . So [facility] [phone].    ATENCIÓN: Si habla español, tiene a tavares disposición servicios gratuitos de asistencia lingüística. Clara martinez [phone].    We comply with applicable federal civil rights laws and [place] laws. We do not discriminate on the basis of race, color, national origin, age, disability, sex, sexual orientation, or gender identity.            Thank you!     Thank you for choosing [facility]  for your care. Our goal is always to provide you with excellent care. Hearing back from our patients is one way we can continue to improve our services. Please take a few minutes to complete the written survey that you may receive in the mail after your visit with us. Thank you!             Your Updated Medication List - Protect others around you: Learn how to safely use, store and throw away your medicines at www.disposemymeds.org.      Notice  As of 7/6/2018  9:38 AM    You have not been prescribed any medications.

## 2018-07-06 NOTE — PATIENT INSTRUCTIONS
Encounter Diagnosis   Name Primary?     Popliteal cyst, right Yes      Rest, ice and elevate above heart level as needed for pain control  1. You have a cyst in the back of your knee. This is called a baker's cyst or popliteal cyst.  This is joint fluid that leaks out from the joint and gets stuck in the back of the knee.    2. The swelling will re absorb over time.  We do not put a needle in the back of your knee because there are major nerves and vessels there.   3. It sometimes can take 5 months for the swelling to go down.  4. Do activity as tolerated.  5. We added information on the Baker's Cyst below.  6.  X-ray: Your x-rays look excellent with no major arthritis or fracture dislocation or tumor.  7. I ordered Mobic 15mg once a day times 2 weeks, then take as needed.  Stop this medication if you have any abdominal pain with it and do not take NSAIDs like Ibuprofen or Aleve while on this medication.  I sent this to your pharmacy.        8. Its possible that when you kicked the [facility] and had some inside knee pain, you may have torn your meniscus at that time a little.  This could be a one way valve for the cyst.  9. We discussed a MRI study that may give us more information but we decided to hold off on that today.    10.  Brace: We decided to get you a new brace today we showed you a few.  Hopefully this will work better than your old knee brace.  11.  We wish we could do more about these Baker's cyst but they are in the back of the knee where the femoral nerve and femoral artery and vein are and it is a dangerous area to work.  Your body should reabsorb it over time.  12. We talked about formal physical therapy, but decided to hold for now.  I have exercises below that you can try.   13.  Follow up with Brittany Orantes MD and/or Jude Singh PA-C on an as needed basis.   Hamstring Stretch (Flexibility)    1. Sit on the floor with your right leg straight in front of you. Bend your left leg so the  sole of your foot rests against the inside of your right thigh.  2. Reach toward your ankle. Keep your knee, neck, and back straight. Feel the stretch in the back of your thigh.  3. Hold for 30 to 60 seconds. Repeat 2 times, or as instructed.  4. Switch legs and repeat.  5. Repeat this exercise 3 times per day, or as instructed.      Tip: Don t bounce while you re stretching.   Date Last Reviewed: 3/10/2016    0909-7500 [employer]. [address] [place] [address]. All rights reserved. This information is not intended as a substitute for professional medical care. Always follow your healthcare professional's instructions.       Quadriceps Stretch (Flexibility)    6. Stand up straight and hold onto a wall, sturdy chair, railing, or table with your right hand.  7. Bend your left leg at the knee behind you, and grab your ankle with your left hand. Pull your left heel toward your buttocks. Don t arch your back.  8. Hold for 30 to 60 seconds. Repeat 2 times.  9. Switch legs and repeat.  Date Last Reviewed: 5/1/2016 2000-2017 [employer]. [address] [place] [address]. All rights reserved. This information is not intended as a substitute for professional medical care. Always follow your healthcare professional's instructions.      Wall Squats for ACL Healing    After you regain muscle control, it s time to build strength. This helps you put full weight on your leg. For best results, warm up and stretch before starting. If your injury is recent, wait until swelling and pain decrease before doing this exercise:    Lean against a wall with your feet hip-width apart. Your feet should be about 18 inches from the wall.    Slowly slide down to a near-sitting position. Don t let your knees go past 90 degrees.    Hold for 10 seconds, then slide back up.    Repeat 5 times.     CAUTION: Do this exercise only if your healthcare provider says it s OK.     7747-2616 [employer]. [address] [place] [address]. All rights reserved. This information is not intended as a substitute for professional medical care. Always follow your healthcare professional's instructions.        Leg and Knee Exercises: Leg Raise    This exercise is designed to stretch and strengthen your knee. Before beginning, read through all the instructions. While exercising, breathe normally and use smooth movements. If you feel any pain, stop the exercise. If pain persists, call your healthcare provider.  Caution    Don t arch your back.    Don t hunch your shoulders.     Sit on the floor with your_________ leg straight, the other bent.    Tighten the thigh muscles on the top of your straight leg. You should feel the muscles contract. Raise that leg 6-8 inches. Then lower it slowly and steadily to the floor. Relax.    Repeat ______ times.  Do ______ sets a day.    8101-9938 [employer]. [address] [place] [address]. All rights reserved. This information is not intended as a substitute for professional medical care. Always follow your healthcare professional's instructions.        Quad Set for Leg and Knee    This exercise is designed to stretch and strengthen your knee. Before beginning, read through all the instructions. While exercising, breathe normally and use smooth movements. If you feel any pain, stop the exercise. If pain persists, call your healthcare provider.  1.  Sit on the floor with one leg straight, the other bent.  2.  Flex the foot of your straight leg by pointing your toes toward you. Press the back of your knee into the floor while tightening the muscle on the top of your thigh. Hold for ______ seconds. Then relax.  3.  Repeat ______ times. Do ______ sets a day.  Caution    Don t arch your back.    Don t hunch your shoulders.    4202-5944 [employer]. [address] [place] [address]. All rights reserved. This information is not  intended as a substitute for professional medical care. Always follow your healthcare professional's instructions.         [employer] and [employer] may offer reliable information regarding your diagnosis and treatment plan.       Understanding Baker s Cyst (Popliteal Cyst)  A Baker s cyst (popliteal cyst) is a fluid-filled sac that forms behind the knee.  Parts of the knee  The knee is a complex joint that has many parts. The lower end of the thighbone (femur) rotates on the upper end of the shinbone (tibia). There are several small bursae around the knee joint. These are small sacs filled with a special fluid (synovial fluid) that cushions the rest of the joint. Between the bones is a space that also contains this fluid.  What causes a Baker s cyst?  A small bursa sits just below the crease at the back of your knee. When this sac fills with too much fluid, it s called a Baker s cyst. This might happen when an injury or disease causes extra synovial fluid to leak into the bursa from the joint space.  In adults, other problems with the knee joint often cause the Baker s cyst. Injury or a knee disorder can change the normal structure of the knee joint. This can cause a cyst to form.  The synovial fluid inside the joint space may build up as a result of injury or disease. As the pressure builds up, the fluid may bulge into the back of the knee. This can cause the cyst.  Symptoms of a Baker s cyst  A Baker s cyst often doesn t cause symptoms. A cyst will more often be seen on an imaging test, like MRI, done for other reasons. If you do have symptoms, they may include:    Pain in the back of the knee    Knee stiffness    Sense of swelling or fullness behind the knee, especially when you straighten your leg    A swelling behind the knee that goes away when you bend your knee  These symptoms tend to get worse when standing for a long time, or being active.  Diagnosing a Baker s cyst  Your health care provider will ask you  about your medical history and your symptoms. He or she will give you a physical exam, which will include a careful exam of your knee. It s important to make sure your symptoms are caused by a Baker s cyst and not a tumor or a blood clot.  If the cause of your symptoms is not clear, you may have imaging tests, such as:    Ultrasound, to look at the cyst in more detail    X-ray, to get more information about the bones of the joint    MRI, if the diagnosis is still unclear after ultrasound, or if your health care provider is considering surgery    7975-9701 [employer]. [address] [place] [address]. All rights reserved. This information is not intended as a substitute for professional medical care. Always follow your healthcare professional's instructions.        Nieves s Cyst    You have a Baker s cyst. This is a lump or bulge in the back of your knee. It is caused when extra joint fluid flows into a small sac behind the knee. The extra fluid occurs because arthritis or a torn cartilage irritates the knee joint.  A small Nieves s cyst often has no symptoms. A larger cyst can cause knee pain or a feeling of pressure behind your knee when you try to fully straighten or bend that joint. A Baker s cyst can leak, leading fluid to move down into your lower leg. This causes swelling, pain, and redness.  Treatment involves draining the extra fluid. Or medicine may be injected to reduce redness and swelling. If the extra fluid is caused by a torn cartilage, then surgery to repair the cartilage may be the best treatment option. If arthritis is the cause, and it does not get better with treatment, surgery can be done to remove the cyst.  Home care    If you have knee pain, stay off the affected leg as much as possible until the pain eases.    Apply an ice pack to the painful area for no more than 20 minutes. Do this every 3 to 6 hours for the first 24 to 48 hours. Keep using ice packs 3 to 4 times a day  for the next few days, as needed for pain. To make an ice pack, put ice cubes in a sealed zip-lock plastic bag. Wrap the bag in a clean, thin towel or cloth. Never put ice or an ice pack directly on the skin.    If you were given a hook-and-loop knee brace, you may open the brace to apply ice. Unless told otherwise, you may remove the brace to bathe and sleep.    You may use over-the-counter pain medicine to control pain, unless another medicine was prescribed. Talk with your provider before using these medicines if you have chronic liver or kidney disease, or have ever had a stomach ulcer or GI (gastrointestinal) bleeding.       If crutches or a walker have been recommended, don t bear full weight on your injured leg until you can do so without pain. Check with your provider before returning to sports or full work duties.  Follow-up care  Follow up with your healthcare provider within 1 to 2 weeks, or as advised.  If X-rays were taken, you will be notified of any new findings that may affect your care.  When to seek medical advice  Call your healthcare provider right away if any of these occur:    Toes or foot become swollen, cold, blue, numb or tingly    Pain or swelling increases    Warmth or redness appears over the knee    Redness, swelling or pain occurs in the calf or lower leg    9351-4458 [employer]. [address] [place] [address]. All rights reserved. This information is not intended as a substitute for professional medical care. Always follow your healthcare professional's instructions.  [employer] and [employer] may offer reliable information regarding your diagnosis and treatment plan.    THANK YOU for coming in today. If you receive a survey via [employer] or mail please let us know if there was anything you especially appreciated today or if there is any way we can improve our clinic. We appreciate your input.    GENERAL INFORMATION:  Our hours are:  Monday :     Clinic 7:30  AM-430 PM ([facility])  Tuesday:      Operating Room All Day ([facility])  Wednesday: Clinic 7:30 AM - 11:15 AM ([facility])             Clinic 1:00 PM - 4:00PM ([facility])  Thursday:     Administrative Day  Friday:          Clinic 7:30 AM - 11:15 AM ([facility])            Clinic 1:00 PM - 4:00 PM ([facility])      [facility] for any issues or concerns: [phone]      We are not in the office Thursdays. Therefore non- urgent calls and medical messages received on Thursday will be addressed when we are back in the office on Wednesday. Urgent matters will be reviewed and addressed by one of our partners in the office as needed.    If lab work was done today as part of your evaluation you will generally be contacted via "[employer]", mail, or phone with the results within 1-5 days. If there is an alarming result we will contact you by phone. Lab results come back at varying times, I generally wait until all labs are resulted before making comments on results. Please note labs are automatically released to "[employer]" (if you have signed up for it) once available-at times you may see these prior to my having a chance to review them as well.    If you need refills please contact your pharmacist. They will send a refill request to me to review. Please allow 3 business days for us to process all refill requests. All narcotic refills should be handled in the clinic at the time of your visit.

## 2018-07-12 ENCOUNTER — HOSPITAL ENCOUNTER (EMERGENCY)
Facility: CLINIC | Age: 28
Discharge: HOME | End: 2018-07-12
Attending: EMERGENCY MEDICINE | Admitting: EMERGENCY MEDICINE
Payer: COMMERCIAL

## 2018-07-12 ENCOUNTER — TELEPHONE (OUTPATIENT)
Dept: ORTHOPEDICS | Facility: CLINIC | Age: 28
End: 2018-07-12

## 2018-07-12 VITALS
TEMPERATURE: 98.5 F | SYSTOLIC BLOOD PRESSURE: 148 MMHG | HEIGHT: 73 IN | BODY MASS INDEX: 37.77 KG/M2 | OXYGEN SATURATION: 97 % | DIASTOLIC BLOOD PRESSURE: 100 MMHG | WEIGHT: 285 LBS

## 2018-07-12 DIAGNOSIS — M25.461 KNEE EFFUSION, RIGHT: ICD-10-CM

## 2018-07-12 DIAGNOSIS — M25.561 ACUTE PAIN OF RIGHT KNEE: ICD-10-CM

## 2018-07-12 PROCEDURE — 99284 EMERGENCY DEPT VISIT MOD MDM: CPT | Mod: Z6 | Performed by: EMERGENCY MEDICINE

## 2018-07-12 PROCEDURE — 99284 EMERGENCY DEPT VISIT MOD MDM: CPT | Mod: 25 | Performed by: EMERGENCY MEDICINE

## 2018-07-12 PROCEDURE — 29505 APPLICATION LONG LEG SPLINT: CPT | Mod: RT | Performed by: EMERGENCY MEDICINE

## 2018-07-12 RX ORDER — HYDROCODONE BITARTRATE AND ACETAMINOPHEN 5; 325 MG/1; MG/1
1 TABLET ORAL EVERY 6 HOURS PRN
Qty: 18 TABLET | Refills: 0 | Status: SHIPPED | OUTPATIENT
Start: 2018-07-12 | End: 2018-07-19

## 2018-07-12 RX ORDER — DICLOFENAC SODIUM 75 MG/1
75 TABLET, DELAYED RELEASE ORAL 2 TIMES DAILY PRN
Qty: 30 TABLET | Refills: 0 | Status: SHIPPED | OUTPATIENT
Start: 2018-07-12 | End: 2018-09-04
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
## 2018-07-12 NOTE — ED AVS SNAPSHOT
[facility] Emergency Department    [address] [place] [address]    Phone:  [phone]    Fax:  [phone]                                       Maco Chen   MRN: [id number]    Department:  [facility] Emergency Department   Date of Visit:  7/12/2018           After Visit Summary Signature Page     I have received my discharge instructions, and my questions have been answered. I have discussed any challenges I see with this plan with the nurse or doctor.    ..........................................................................................................................................  Patient/Patient Representative Signature      ..........................................................................................................................................  Patient Representative Print Name and Relationship to Patient    ..................................................               ................................................  Date                                            Time    ..........................................................................................................................................  Reviewed by Signature/Title    ...................................................              ..............................................  Date                                                            Time

## 2018-07-12 NOTE — ED AVS SNAPSHOT
[facility] Emergency Department    [address] [place] [address]    [place] [address]    Phone:  [phone]    Fax:  [phone]                                       Maco Chen   MRN: [id number]    Department:  [facility] Emergency Department   Date of Visit:  7/12/2018           Patient Information     Date Of Birth          1990        Your diagnoses for this visit were:     Knee effusion, right     Acute pain of right knee        You were seen by Stephanie Fernandez MD.      Follow-up Information     Follow up with Orthopedics.        Discharge Instructions       Continue rest, ice, elevation.  Knee immobilizer as needed.    Follow-up with MRI scan and orthopedics as scheduled.    Stop the Mobic.  Try Diclofenac for pain or inflammation.  Take with food or milk.    Vicodin for severe pain.  This may cause constipation.  Take stool softeners if needed.  Drink plenty of fluids.    Return for significant worsening, changes or concerns.    I hope you start to feel MUCH better quickly!!    Discharge References/Attachments     KNEE PAIN OF UNCERTAIN CAUSE (ENGLISH)    KNEE EFFUSION (ENGLISH)      Your next 10 appointments already scheduled     Jul 15, 2018  9:00 AM CDT   MR KNEE RIGHT W/O CONTRAST with PHMR1   [facility] MRI ([facility])    [address] [place] [address] [phone]           Take your medicines as usual, unless your doctor tells you not to. Bring a list of your current medicines to your exam (including vitamins, minerals and over-the-counter drugs). Also bring the results of similar scans you may have had.  Please remove any body piercings and hair extensions before you arrive.  Follow your doctor s orders. If you do not, we may have to postpone your exam.  You may or may not receive IV contrast for this exam pending the discretion of the Radiologist.  You do not need to do anything special to prepare.  The MRI machine  uses a strong magnet. Please wear clothes without metal (snaps, zippers). A sweatsuit works well, or we may give you a hospital gown.   **IMPORTANT** THE INSTRUCTIONS BELOW ARE ONLY FOR THOSE PATIENTS WHO HAVE BEEN PRESCRIBED SEDATION OR GENERAL ANESTHESIA DURING THEIR MRI PROCEDURE:  IF YOUR DOCTOR PRESCRIBED ORAL SEDATION (take medicine to help you relax during your exam):   You must get the medicine from your doctor (oral medication) before you arrive. Bring the medicine to the exam. Do not take it at home. You ll be told when to take it upon arriving for your exam.   Arrive one hour early. Bring someone who can take you home after the test. Your medicine will make you sleepy. After the exam, you may not drive, take a bus or take a taxi by yourself.  IF YOUR DOCTOR PRESCRIBED IV SEDATION:   Arrive one hour early. Bring someone who can take you home after the test. Your medicine will make you sleepy. After the exam, you may not drive, take a bus or take a taxi by yourself.   No eating 6 hours before your exam. You may have clear liquids up until 4 hours before your exam. (Clear liquids include water, clear tea, black coffee and fruit juice without pulp.)  IF YOUR DOCTOR PRESCRIBED ANESTHESIA (be asleep for your exam):   Arrive 1 1/2 hours early. Bring someone who can take you home after the test. You may not drive, take a bus or take a taxi by yourself.   No eating 8 hours before your exam. You may have clear liquids up until 4 hours before your exam. (Clear liquids include water, clear tea, black coffee and fruit juice without pulp.)   You will spend four to five hours in the recovery room.  Please call the Imaging Department at your exam site with any questions.            Jul 17, 2018  7:40 AM CDT   [facility]dy [facility]   [facility]    [address] [place] [address] [phone]              24 Hour Appointment Hotline       To  make an appointment at any [facility] clinic, call [phone] ([phone]). If you don't have a family doctor or clinic, we will help you find one. [facility] clinics are conveniently located to serve the needs of you and your family.          ED Discharge Orders     Knee immobilizer           MR Knee Right w/o Contrast                    Review of your medicines      START taking        Dose / Directions Last dose taken    diclofenac 75 MG EC tablet   Commonly known as:  VOLTAREN   Dose:  75 mg   Quantity:  30 tablet        Take 1 tablet (75 mg) by mouth 2 times daily as needed for moderate pain   Refills:  0        HYDROcodone-acetaminophen 5-325 MG per tablet   Commonly known as:  NORCO   Dose:  1 tablet   Quantity:  18 tablet        Take 1 tablet by mouth every 6 hours as needed for pain   Refills:  0          STOP taking        Dose Reason for stopping Comments    meloxicam 15 MG tablet   Commonly known as:  MOBIC                      Information about OPIOIDS     PRESCRIPTION OPIOIDS: WHAT YOU NEED TO KNOW   We gave you an opioid (narcotic) pain medicine. It is important to manage your pain, but opioids are not always the best choice. You should first try all the other options your care team gave you. Take this medicine for as short a time (and as few doses) as possible.     These medicines have risks:    DO NOT drive when on new or higher doses of pain medicine. These medicines can affect your alertness and reaction times, and you could be arrested for driving under the influence (DUI). If you need to use opioids long-term, talk to your care team about driving.    DO NOT operate heave machinery    DO NOT do any other dangerous activities while taking these medicines.     DO NOT drink any alcohol while taking these medicines.      If the opioid prescribed includes acetaminophen, DO NOT take with any other medicines that contain acetaminophen. Read all labels carefully. Look for the word  acetaminophen  or   Tylenol.  Ask your pharmacist if you have questions or are unsure.    You can get addicted to pain medicines, especially if you have a history of addiction (chemical, alcohol or substance dependence). Talk to your care team about ways to reduce this risk.    Store your pills in a secure place, locked if possible. We will not replace any lost or stolen medicine. If you don t finish your medicine, please throw away (dispose) as directed by your pharmacist. The [place] [employer]ution [employer] has more information about safe disposal: https://www.pca.[facility].[place].us/living-green/managing-unwanted-medications.     All opioids tend to cause constipation. Drink plenty of water and eat foods that have a lot of fiber, such as fruits, vegetables, prune juice, apple juice and high-fiber cereal. Take a laxative (Miralax, milk of magnesia, Colace, Senna) if you don t move your bowels at least every other day.         Prescriptions were sent or printed at these locations (2 Prescriptions)                   [facility] [place] [id number]    Telephone:  [phone]   Fax:  [phone]   Hours:                  Printed at Department/Unit printer (2 of 2)         diclofenac (VOLTAREN) 75 MG EC tablet               HYDROcodone-acetaminophen (NORCO) 5-325 MG per tablet                Orders Needing Specimen Collection     None      Pending Results     No orders found from 7/10/2018 to 7/13/2018.            Pending Culture Results     No orders found from 7/10/2018 to 7/13/2018.            Pending Results Instructions     If you had any lab results that were not finalized at the time of your Discharge, you can call the ED Lab Result RN at [phone]. You will be contacted by this team for any positive Lab results or changes in treatment. The nurses are available 7 days a week from 10A to 6:30P.  You can leave a message 24 hours per day and they will return your call.         Thank you for choosing [facility]       Thank you for choosing [facility] for your care. Our goal is always to provide you with excellent care. Hearing back from our patients is one way we can continue to improve our services. Please take a few minutes to complete the written survey that you may receive in the mail after you visit with us. Thank you!        Care EveryWhere ID     This is your Care EveryWhere ID. This could be used by other organizations to access your [facility] medical records  [id number]        Equal Access to Services     RADHA JO : Hadii tyler aguilaro Sogilberto, waaxda luqadaha, qaybta kaalmada adetejyada, alison omran . So [facility] [phone].    ATENCIÓN: Si habla español, tiene a tavares disposición servicios gratuitos de asistencia lingüística. [facility] [phone].    We comply with applicable federal civil rights laws and [place] laws. We do not discriminate on the basis of race, color, national origin, age, disability, sex, sexual orientation, or gender identity.            After Visit Summary       This is your record. Keep this with you and show to your community pharmacist(s) and doctor(s) at your next visit.

## 2018-07-12 NOTE — TELEPHONE ENCOUNTER
"Pt called in. He had been seen by Dr. Orantes last week with Baker's cyst of right knee. Today he left work in [place] due to his right knee locking up on him when it is straight and to try to bend if has extreme pain. The pain is on sides of his knee and front. \"My knee is about 3 times the size\" Pt was thinking of going to the ED.   I told pt I felt that was a good decision, that he needed to have this looked at.SUSAN Shelley     "

## 2018-07-12 NOTE — DISCHARGE INSTRUCTIONS
Continue rest, ice, elevation.  Knee immobilizer as needed.    Follow-up with MRI scan and orthopedics as scheduled.    Stop the Mobic.  Try Diclofenac for pain or inflammation.  Take with food or milk.    Vicodin for severe pain.  This may cause constipation.  Take stool softeners if needed.  Drink plenty of fluids.    Return for significant worsening, changes or concerns.    I hope you start to feel MUCH better quickly!!

## 2018-07-15 ENCOUNTER — HOSPITAL ENCOUNTER (OUTPATIENT)
Dept: MRI IMAGING | Facility: CLINIC | Age: 28
Discharge: HOME | End: 2018-07-15
Attending: EMERGENCY MEDICINE | Admitting: EMERGENCY MEDICINE
Payer: COMMERCIAL

## 2018-07-15 DIAGNOSIS — M25.561 ACUTE PAIN OF RIGHT KNEE: ICD-10-CM

## 2018-07-15 DIAGNOSIS — M25.461 KNEE EFFUSION, RIGHT: ICD-10-CM

## 2018-07-15 PROCEDURE — 73721 MRI JNT OF LWR EXTRE W/O DYE: CPT | Mod: RT

## 2018-07-17 ENCOUNTER — OFFICE VISIT (OUTPATIENT)
Dept: ORTHOPEDICS | Facility: CLINIC | Age: 28
End: 2018-07-17
Payer: COMMERCIAL

## 2018-07-17 ENCOUNTER — TELEPHONE (OUTPATIENT)
Dept: ORTHOPEDICS | Facility: CLINIC | Age: 28
End: 2018-07-17

## 2018-07-17 VITALS
HEART RATE: 84 BPM | DIASTOLIC BLOOD PRESSURE: 96 MMHG | WEIGHT: 285 LBS | SYSTOLIC BLOOD PRESSURE: 155 MMHG | BODY MASS INDEX: 37.77 KG/M2 | HEIGHT: 73 IN

## 2018-07-17 DIAGNOSIS — S83.241A OTHER TEAR OF MEDIAL MENISCUS OF RIGHT KNEE AS CURRENT INJURY, INITIAL ENCOUNTER: Primary | ICD-10-CM

## 2018-07-17 PROCEDURE — 99214 OFFICE O/P EST MOD 30 MIN: CPT | Performed by: ORTHOPAEDIC SURGERY

## 2018-07-17 RX ORDER — ZOLPIDEM TARTRATE 10 MG/1
10 TABLET ORAL
Qty: 30 TABLET | Refills: 1 | Status: SHIPPED | OUTPATIENT
Start: 2018-07-17 | End: 2019-01-22

## 2018-07-17 ASSESSMENT — PAIN SCALES - GENERAL: PAINLEVEL: EXTREME PAIN (8)
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
## 2018-07-17 NOTE — MR AVS SNAPSHOT
"              After Visit Summary   7/17/2018    Maco Chen    MRN: [id number]           Patient Information     Date Of Birth          1990        Visit Information        Provider Department      7/17/2018 7:40 AM Miguel Brody MD [facility]         Follow-ups after your visit        Who to contact     If you have questions or need follow up information about today's clinic visit or your schedule please contact [facility] directly at [phone].  Normal or non-critical lab and imaging results will be communicated to you by MyChart, letter or phone within 4 business days after the clinic has received the results. If you do not hear from us within 7 days, please contact the clinic through MyChart or phone. If you have a critical or abnormal lab result, we will notify you by phone as soon as possible.  Submit refill requests through [employer] or call your pharmacy and they will forward the refill request to us. Please allow 3 business days for your refill to be completed.          Additional Information About Your Visit        Care EveryWhere ID     This is your Care EveryWhere ID. This could be used by other organizations to access your [place] medical records  [id number]        Your Vitals Were     Pulse Height BMI (Body Mass Index)             84 1.854 m (6' 1\") 37.6 kg/m2          Blood Pressure from Last 3 Encounters:   07/17/18 (!) 155/96   07/12/18 (!) 148/100   07/06/18 152/82    Weight from Last 3 Encounters:   07/17/18 129.3 kg (285 lb)   07/12/18 129.3 kg (285 lb)   07/06/18 128.4 kg (283 lb)              Today, you had the following     No orders found for display       Primary Care Provider Fax #    Physician No Ref-Primary [phone]       No address on file        Equal Access to Services     RADHA JO AH: Tonia Gardner, joyce vance, bharath patrick, alison whitlock. So walinda " [phone].    ATENCIÓN: Si wolf adam, tiene a tavares disposición servicios gratuitos de asistencia lingüística. Clara martinez [phone].    We comply with applicable federal civil rights laws and [place] laws. We do not discriminate on the basis of race, color, national origin, age, disability, sex, sexual orientation, or gender identity.            Thank you!     Thank you for choosing [facility]  for your care. Our goal is always to provide you with excellent care. Hearing back from our patients is one way we can continue to improve our services. Please take a few minutes to complete the written survey that you may receive in the mail after your visit with us. Thank you!             Your Updated Medication List - Protect others around you: Learn how to safely use, store and throw away your medicines at www.disposemymeds.org.          This list is accurate as of 7/17/18  7:49 AM.  Always use your most recent med list.                   Brand Name Dispense Instructions for use Diagnosis    diclofenac 75 MG EC tablet    VOLTAREN    30 tablet    Take 1 tablet (75 mg) by mouth 2 times daily as needed for moderate pain        HYDROcodone-acetaminophen 5-325 MG per tablet    NORCO    18 tablet    Take 1 tablet by mouth every 6 hours as needed for pain

## 2018-07-17 NOTE — TELEPHONE ENCOUNTER
Type of surgery: Right knee arthroscopy with medial meniscus debridement or possible repair  Location of surgery: [facility]   Date of surgery: 7/25/18  Surgeon: Dr. Brody  Pre-Op Appt Date: 7/19/18  Post-Op Appt Date: 8/7/18   Packet sent out: Surgery packet was given to patient in clinic.   Pre-cert/Authorization completed: NA  Date: 7/17/2018    Krissy Gutierrez  Surgery Scheduler
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
## 2018-07-19 ENCOUNTER — OFFICE VISIT (OUTPATIENT)
Dept: FAMILY MEDICINE | Facility: OTHER | Age: 28
End: 2018-07-19
Payer: COMMERCIAL

## 2018-07-19 VITALS
SYSTOLIC BLOOD PRESSURE: 140 MMHG | TEMPERATURE: 97.4 F | RESPIRATION RATE: 16 BRPM | HEIGHT: 73 IN | DIASTOLIC BLOOD PRESSURE: 80 MMHG | BODY MASS INDEX: 37.25 KG/M2 | OXYGEN SATURATION: 98 % | WEIGHT: 281.1 LBS | HEART RATE: 80 BPM

## 2018-07-19 DIAGNOSIS — I10 ESSENTIAL HYPERTENSION: ICD-10-CM

## 2018-07-19 DIAGNOSIS — S83.241D TEAR OF MEDIAL MENISCUS OF RIGHT KNEE, CURRENT, UNSPECIFIED TEAR TYPE, SUBSEQUENT ENCOUNTER: ICD-10-CM

## 2018-07-19 DIAGNOSIS — Z01.818 PREOP GENERAL PHYSICAL EXAM: Primary | ICD-10-CM

## 2018-07-19 LAB
ANION GAP SERPL CALCULATED.3IONS-SCNC: 6 MMOL/L (ref 3–14)
BASOPHILS # BLD AUTO: 0 10E9/L (ref 0–0.2)
BASOPHILS NFR BLD AUTO: 0.2 %
BUN SERPL-MCNC: 11 MG/DL (ref 7–30)
CALCIUM SERPL-MCNC: 9.3 MG/DL (ref 8.5–10.1)
CHLORIDE SERPL-SCNC: 102 MMOL/L (ref 94–109)
CO2 SERPL-SCNC: 31 MMOL/L (ref 20–32)
CREAT SERPL-MCNC: 0.91 MG/DL (ref 0.66–1.25)
DIFFERENTIAL METHOD BLD: NORMAL
EOSINOPHIL # BLD AUTO: 0.2 10E9/L (ref 0–0.7)
EOSINOPHIL NFR BLD AUTO: 2.1 %
ERYTHROCYTE [DISTWIDTH] IN BLOOD BY AUTOMATED COUNT: 13.4 % (ref 10–15)
GFR SERPL CREATININE-BSD FRML MDRD: >90 ML/MIN/1.7M2
GLUCOSE SERPL-MCNC: 91 MG/DL (ref 70–99)
HBA1C MFR BLD: 4.9 % (ref 0–5.6)
HCT VFR BLD AUTO: 46.5 % (ref 40–53)
HGB BLD-MCNC: 16 G/DL (ref 13.3–17.7)
LYMPHOCYTES # BLD AUTO: 2 10E9/L (ref 0.8–5.3)
LYMPHOCYTES NFR BLD AUTO: 20.5 %
MCH RBC QN AUTO: 30.1 PG (ref 26.5–33)
MCHC RBC AUTO-ENTMCNC: 34.4 G/DL (ref 31.5–36.5)
MCV RBC AUTO: 87 FL (ref 78–100)
MONOCYTES # BLD AUTO: 0.8 10E9/L (ref 0–1.3)
MONOCYTES NFR BLD AUTO: 8.3 %
NEUTROPHILS # BLD AUTO: 6.7 10E9/L (ref 1.6–8.3)
NEUTROPHILS NFR BLD AUTO: 68.9 %
PLATELET # BLD AUTO: 345 10E9/L (ref 150–450)
POTASSIUM SERPL-SCNC: 5.4 MMOL/L (ref 3.4–5.3)
RBC # BLD AUTO: 5.32 10E12/L (ref 4.4–5.9)
SODIUM SERPL-SCNC: 139 MMOL/L (ref 133–144)
WBC # BLD AUTO: 9.7 10E9/L (ref 4–11)

## 2018-07-19 PROCEDURE — 36415 COLL VENOUS BLD VENIPUNCTURE: CPT | Performed by: PHYSICIAN ASSISTANT

## 2018-07-19 PROCEDURE — 85025 COMPLETE CBC W/AUTO DIFF WBC: CPT | Performed by: PHYSICIAN ASSISTANT

## 2018-07-19 PROCEDURE — 80048 BASIC METABOLIC PNL TOTAL CA: CPT | Performed by: PHYSICIAN ASSISTANT

## 2018-07-19 PROCEDURE — 99214 OFFICE O/P EST MOD 30 MIN: CPT | Performed by: PHYSICIAN ASSISTANT

## 2018-07-19 PROCEDURE — 83036 HEMOGLOBIN GLYCOSYLATED A1C: CPT | Performed by: PHYSICIAN ASSISTANT

## 2018-07-19 RX ORDER — LISINOPRIL 5 MG/1
5 TABLET ORAL DAILY
Qty: 90 TABLET | Refills: 1 | Status: SHIPPED | OUTPATIENT
Start: 2018-07-19 | End: 2019-01-22

## 2018-07-19 ASSESSMENT — PAIN SCALES - GENERAL: PAINLEVEL: EXTREME PAIN (8)

## 2018-07-19 NOTE — MR AVS SNAPSHOT
After Visit Summary   7/19/2018    Maco Chen    MRN: [id number]           Patient Information     Date Of Birth          1990        Visit Information        Provider Department      7/19/2018 9:00 AM Raghu Wilkins PA-C [facility]        Today's Diagnoses     Preop general physical exam    -  1    Tear of medial meniscus of right knee, current, unspecified tear type, subsequent encounter        Essential hypertension          Care Instructions      Before Your Surgery      Call your surgeon if there is any change in your health. This includes signs of a cold or flu (such as a sore throat, runny nose, cough, rash or fever).    Do not smoke, drink alcohol or take over the counter medicine (unless your surgeon or primary care doctor tells you to) for the 24 hours before and after surgery.    If you take prescribed drugs: Follow your doctor s orders about which medicines to take and which to stop until after surgery.    Eating and drinking prior to surgery: follow the instructions from your surgeon    Take a shower or bath the night before surgery. Use the soap your surgeon gave you to gently clean your skin. If you do not have soap from your surgeon, use your regular soap. Do not shave or scrub the surgery site.  Wear clean pajamas and have clean sheets on your bed.           Follow-ups after your visit        Your next 10 appointments already scheduled     Jul 25, 2018   Procedure with Miguel Brody MD   [facility] ([facility])    [address] [place] [address] [address] [place] [address]   [phone]           From [address]: Exit at [address] on [address] side of [place]. Turn right on [address]. Turn left at stoplight on [address]. [facility] will be in view two blocks ahead            Aug 07, 2018  7:50 AM CDT   Return Visit with Miguel Brody MD   [facility]  "   [address] [place] [address] [id number] [phone]              Who to contact     If you have questions or need follow up information about today's clinic visit or your schedule please contact [facility] directly at [phone].  Normal or non-critical lab and imaging results will be communicated to you by MyChart, letter or phone within 4 business days after the clinic has received the results. If you do not hear from us within 7 days, please contact the clinic through MyChart or phone. If you have a critical or abnormal lab result, we will notify you by phone as soon as possible.  Submit refill requests through [employer]oof or call your pharmacy and they will forward the refill request to us. Please allow 3 business days for your refill to be completed.          Additional Information About Your Visit        Care EveryWhere ID     This is your Care EveryWhere ID. This could be used by other organizations to access your [facility] medical records  [id number]        Your Vitals Were     Pulse Temperature Respirations Height Pulse Oximetry BMI (Body Mass Index)    80 97.4  F (36.3  C) (Oral) 16 6' 1\" (1.854 m) 98% 37.09 kg/m2       Blood Pressure from Last 3 Encounters:   07/19/18 140/80   07/17/18 (!) 155/96   07/12/18 (!) 148/100    Weight from Last 3 Encounters:   07/19/18 281 lb 1.6 oz (127.5 kg)   07/17/18 285 lb (129.3 kg)   07/12/18 285 lb (129.3 kg)              We Performed the Following     Basic metabolic panel  (Ca, Cl, CO2, Creat, Gluc, K, Na, BUN)     CBC with platelets differential     Hemoglobin A1c          Today's Medication Changes          These changes are accurate as of 7/19/18  9:32 AM.  If you have any questions, ask your nurse or doctor.               Start taking these medicines.        Dose/Directions    lisinopril 5 MG tablet   Commonly known as:  PRINIVIL/ZESTRIL   Used for:  Essential hypertension   Started by:  Raghu Wilkins PA-C        Dose:  5 mg   Take 1 " tablet (5 mg) by mouth daily   Quantity:  90 tablet   Refills:  1            Where to get your medicines      These medications were sent to [facility] - [place], [place] - [address]  [address] , [facility] [address]     Phone:  [phone]     lisinopril 5 MG tablet                Primary Care Provider Fax #    Physician No Ref-Primary [phone]       No address on file        Equal Access to Services     RADHA JO : Hadii aad ku hadasho Soomaali, waaxda luqadaha, qaybta kaalmada adeegyada, waxay idiin hayaan adeeg kharash laSierrahiwot ah. So [facility] [phone].    ATENCIÓN: Si habla esprangel, tiene a tavares disposición servicios gratuitos de asistencia lingüística. Llame al [phone].    We comply with applicable federal civil rights laws and [place] laws. We do not discriminate on the basis of race, color, national origin, age, disability, sex, sexual orientation, or gender identity.            Thank you!     Thank you for choosing [facility]  for your care. Our goal is always to provide you with excellent care. Hearing back from our patients is one way we can continue to improve our services. Please take a few minutes to complete the written survey that you may receive in the mail after your visit with us. Thank you!             Your Updated Medication List - Protect others around you: Learn how to safely use, store and throw away your medicines at www.disposemymeds.org.          This list is accurate as of 7/19/18  9:32 AM.  Always use your most recent med list.                   Brand Name Dispense Instructions for use Diagnosis    diclofenac 75 MG EC tablet    VOLTAREN    30 tablet    Take 1 tablet (75 mg) by mouth 2 times daily as needed for moderate pain        lisinopril 5 MG tablet    PRINIVIL/ZESTRIL    90 tablet    Take 1 tablet (5 mg) by mouth daily    Essential hypertension       zolpidem 10 MG tablet    AMBIEN    30 tablet    Take 1 tablet (10 mg) by mouth nightly as needed  for sleep

## 2018-07-19 NOTE — NURSING NOTE
Health Maintenance Due   Topic Date Due     TOBACCO CESSATION COUNSELING Q1 YR  1990     HIV SCREEN (SYSTEM ASSIGNED)  12/29/2010     PHQ-2 Q1 YR  05/01/2018     Venice BARRIENTOS LPN
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
## 2018-07-25 ENCOUNTER — ANESTHESIA (OUTPATIENT)
Dept: SURGERY | Facility: CLINIC | Age: 28
End: 2018-07-25

## 2018-07-25 ENCOUNTER — ANESTHESIA EVENT (OUTPATIENT)
Dept: SURGERY | Facility: CLINIC | Age: 28
End: 2018-07-25

## 2018-07-25 ENCOUNTER — HOSPITAL ENCOUNTER (OUTPATIENT)
Facility: CLINIC | Age: 28
Discharge: HOME | End: 2018-07-25
Attending: ORTHOPAEDIC SURGERY | Admitting: ORTHOPAEDIC SURGERY
Payer: COMMERCIAL

## 2018-07-25 ENCOUNTER — SURGERY (OUTPATIENT)
Age: 28
End: 2018-07-25

## 2018-07-25 VITALS
DIASTOLIC BLOOD PRESSURE: 63 MMHG | HEART RATE: 106 BPM | RESPIRATION RATE: 18 BRPM | SYSTOLIC BLOOD PRESSURE: 132 MMHG | OXYGEN SATURATION: 96 % | TEMPERATURE: 98.5 F

## 2018-07-25 PROCEDURE — 40000879 ZZH CANCELLED SURGERY UP TO 16-30 MINS: Performed by: ORTHOPAEDIC SURGERY

## 2018-07-25 RX ORDER — CEFAZOLIN SODIUM 1 G/50ML
3 SOLUTION INTRAVENOUS
Status: DISCONTINUED | OUTPATIENT
Start: 2018-07-25 | End: 2018-07-25 | Stop reason: HOSPADM

## 2018-07-25 RX ORDER — SODIUM CHLORIDE, SODIUM LACTATE, POTASSIUM CHLORIDE, CALCIUM CHLORIDE 600; 310; 30; 20 MG/100ML; MG/100ML; MG/100ML; MG/100ML
INJECTION, SOLUTION INTRAVENOUS CONTINUOUS
Status: DISCONTINUED | OUTPATIENT
Start: 2018-07-25 | End: 2018-07-25 | Stop reason: HOSPADM

## 2018-07-25 RX ORDER — CEFAZOLIN SODIUM 1 G/3ML
1 INJECTION, POWDER, FOR SOLUTION INTRAMUSCULAR; INTRAVENOUS SEE ADMIN INSTRUCTIONS
Status: DISCONTINUED | OUTPATIENT
Start: 2018-07-25 | End: 2018-07-25 | Stop reason: HOSPADM

## 2018-07-25 ASSESSMENT — LIFESTYLE VARIABLES: TOBACCO_USE: 1

## 2018-07-25 NOTE — TELEPHONE ENCOUNTER
Patient has been rescheduled to 8/8 due to him eating before surgery. Roxanne notified and post op changed.

## 2018-07-25 NOTE — ANESTHESIA PREPROCEDURE EVALUATION
Anesthesia Evaluation     . Pt has had prior anesthetic. Type: General           ROS/MED HX    ENT/Pulmonary:     (+)sleep apnea, tobacco use, Current use , . .    Neurologic:  - neg neurologic ROS     Cardiovascular:  - neg cardiovascular ROS       METS/Exercise Tolerance:  >4 METS   Hematologic:  - neg hematologic  ROS       Musculoskeletal:   (+) , , other musculoskeletal- Right knee arthroscopy      GI/Hepatic:  - neg GI/hepatic ROS       Renal/Genitourinary:  - ROS Renal section negative       Endo:     (+) Obesity, .      Psychiatric: Comment: Oppositional defiant disorder    (+) psychiatric history other (comment)      Infectious Disease:  - neg infectious disease ROS       Malignancy:      - no malignancy   Other:    - neg other ROS                 Physical Exam  Normal systems: cardiovascular, pulmonary and dental    Airway   Mallampati: I  TM distance: >3 FB  Neck ROM: full    Dental     Cardiovascular   Rhythm and rate: regular and normal      Pulmonary    breath sounds clear to auscultation                    Anesthesia Plan      History & Physical Review  History and physical reviewed and following examination; no interval change.    ASA Status:  2 .    NPO Status:  > 8 hours    Plan for General and LMA with Intravenous and Propofol induction. Maintenance will be Inhalation.    PONV prophylaxis:  Ondansetron (or other 5HT-3) and Dexamethasone or Solumedrol       Postoperative Care  Postoperative pain management:  IV analgesics and Oral pain medications.      Consents  Anesthetic plan, risks, benefits and alternatives discussed with:  Patient..                          .

## 2018-08-07 ENCOUNTER — ANESTHESIA EVENT (OUTPATIENT)
Dept: SURGERY | Facility: CLINIC | Age: 28
End: 2018-08-07
Payer: COMMERCIAL

## 2018-08-08 ENCOUNTER — ANESTHESIA (OUTPATIENT)
Dept: SURGERY | Facility: CLINIC | Age: 28
End: 2018-08-08
Payer: COMMERCIAL

## 2018-08-08 ENCOUNTER — HOSPITAL ENCOUNTER (OUTPATIENT)
Facility: CLINIC | Age: 28
Discharge: HOME | End: 2018-08-08
Attending: ORTHOPAEDIC SURGERY | Admitting: ORTHOPAEDIC SURGERY
Payer: COMMERCIAL

## 2018-08-08 ENCOUNTER — SURGERY (OUTPATIENT)
Age: 28
End: 2018-08-08

## 2018-08-08 ENCOUNTER — TELEPHONE (OUTPATIENT)
Dept: ORTHOPEDICS | Facility: CLINIC | Age: 28
End: 2018-08-08

## 2018-08-08 VITALS
DIASTOLIC BLOOD PRESSURE: 77 MMHG | OXYGEN SATURATION: 95 % | SYSTOLIC BLOOD PRESSURE: 148 MMHG | TEMPERATURE: 98.8 F | RESPIRATION RATE: 16 BRPM

## 2018-08-08 DIAGNOSIS — G89.18 POST-OP PAIN: Primary | ICD-10-CM

## 2018-08-08 PROCEDURE — 25000128 H RX IP 250 OP 636: Performed by: PHYSICIAN ASSISTANT

## 2018-08-08 PROCEDURE — 37000008 ZZH ANESTHESIA TECHNICAL FEE, 1ST 30 MIN: Performed by: ORTHOPAEDIC SURGERY

## 2018-08-08 PROCEDURE — 25000125 ZZHC RX 250: Performed by: ORTHOPAEDIC SURGERY

## 2018-08-08 PROCEDURE — 25000125 ZZHC RX 250: Performed by: NURSE ANESTHETIST, CERTIFIED REGISTERED

## 2018-08-08 PROCEDURE — 25000566 ZZH SEVOFLURANE, EA 15 MIN: Performed by: ORTHOPAEDIC SURGERY

## 2018-08-08 PROCEDURE — 71000014 ZZH RECOVERY PHASE 1 LEVEL 2 FIRST HR: Performed by: ORTHOPAEDIC SURGERY

## 2018-08-08 PROCEDURE — 25000128 H RX IP 250 OP 636: Performed by: NURSE ANESTHETIST, CERTIFIED REGISTERED

## 2018-08-08 PROCEDURE — 29881 ARTHRS KNE SRG MNISECTMY M/L: CPT | Mod: RT | Performed by: ORTHOPAEDIC SURGERY

## 2018-08-08 PROCEDURE — 37000009 ZZH ANESTHESIA TECHNICAL FEE, EACH ADDTL 15 MIN: Performed by: ORTHOPAEDIC SURGERY

## 2018-08-08 PROCEDURE — 36000056 ZZH SURGERY LEVEL 3 1ST 30 MIN: Performed by: ORTHOPAEDIC SURGERY

## 2018-08-08 PROCEDURE — 40000306 ZZH STATISTIC PRE PROC ASSESS II: Performed by: ORTHOPAEDIC SURGERY

## 2018-08-08 PROCEDURE — 27210794 ZZH OR GENERAL SUPPLY STERILE: Performed by: ORTHOPAEDIC SURGERY

## 2018-08-08 PROCEDURE — 71000027 ZZH RECOVERY PHASE 2 EACH 15 MINS: Performed by: ORTHOPAEDIC SURGERY

## 2018-08-08 PROCEDURE — 25000132 ZZH RX MED GY IP 250 OP 250 PS 637: Performed by: ORTHOPAEDIC SURGERY

## 2018-08-08 PROCEDURE — 36000058 ZZH SURGERY LEVEL 3 EA 15 ADDTL MIN: Performed by: ORTHOPAEDIC SURGERY

## 2018-08-08 RX ORDER — CEFAZOLIN SODIUM 1 G/50ML
3 SOLUTION INTRAVENOUS
Status: COMPLETED | OUTPATIENT
Start: 2018-08-08 | End: 2018-08-08

## 2018-08-08 RX ORDER — CEFAZOLIN SODIUM 1 G/3ML
1 INJECTION, POWDER, FOR SOLUTION INTRAMUSCULAR; INTRAVENOUS SEE ADMIN INSTRUCTIONS
Status: DISCONTINUED | OUTPATIENT
Start: 2018-08-08 | End: 2018-08-08 | Stop reason: HOSPADM

## 2018-08-08 RX ORDER — HYDROMORPHONE HYDROCHLORIDE 1 MG/ML
.3-.5 INJECTION, SOLUTION INTRAMUSCULAR; INTRAVENOUS; SUBCUTANEOUS EVERY 10 MIN PRN
Status: DISCONTINUED | OUTPATIENT
Start: 2018-08-08 | End: 2018-08-08 | Stop reason: HOSPADM

## 2018-08-08 RX ORDER — SODIUM CHLORIDE, SODIUM LACTATE, POTASSIUM CHLORIDE, CALCIUM CHLORIDE 600; 310; 30; 20 MG/100ML; MG/100ML; MG/100ML; MG/100ML
INJECTION, SOLUTION INTRAVENOUS CONTINUOUS
Status: DISCONTINUED | OUTPATIENT
Start: 2018-08-08 | End: 2018-08-08 | Stop reason: HOSPADM

## 2018-08-08 RX ORDER — BUPIVACAINE HYDROCHLORIDE AND EPINEPHRINE 5; 5 MG/ML; UG/ML
INJECTION, SOLUTION PERINEURAL PRN
Status: DISCONTINUED | OUTPATIENT
Start: 2018-08-08 | End: 2018-08-08 | Stop reason: HOSPADM

## 2018-08-08 RX ORDER — ALBUTEROL SULFATE 0.83 MG/ML
2.5 SOLUTION RESPIRATORY (INHALATION) EVERY 4 HOURS PRN
Status: DISCONTINUED | OUTPATIENT
Start: 2018-08-08 | End: 2018-08-08 | Stop reason: HOSPADM

## 2018-08-08 RX ORDER — ONDANSETRON 4 MG/1
4 TABLET, ORALLY DISINTEGRATING ORAL EVERY 30 MIN PRN
Status: DISCONTINUED | OUTPATIENT
Start: 2018-08-08 | End: 2018-08-08 | Stop reason: HOSPADM

## 2018-08-08 RX ORDER — ONDANSETRON 2 MG/ML
4 INJECTION INTRAMUSCULAR; INTRAVENOUS EVERY 30 MIN PRN
Status: DISCONTINUED | OUTPATIENT
Start: 2018-08-08 | End: 2018-08-08 | Stop reason: HOSPADM

## 2018-08-08 RX ORDER — FENTANYL CITRATE 50 UG/ML
25-50 INJECTION, SOLUTION INTRAMUSCULAR; INTRAVENOUS
Status: DISCONTINUED | OUTPATIENT
Start: 2018-08-08 | End: 2018-08-08 | Stop reason: HOSPADM

## 2018-08-08 RX ORDER — DEXAMETHASONE SODIUM PHOSPHATE 10 MG/ML
INJECTION, SOLUTION INTRAMUSCULAR; INTRAVENOUS PRN
Status: DISCONTINUED | OUTPATIENT
Start: 2018-08-08 | End: 2018-08-08

## 2018-08-08 RX ORDER — KETOROLAC TROMETHAMINE 30 MG/ML
INJECTION, SOLUTION INTRAMUSCULAR; INTRAVENOUS PRN
Status: DISCONTINUED | OUTPATIENT
Start: 2018-08-08 | End: 2018-08-08

## 2018-08-08 RX ORDER — MEPERIDINE HYDROCHLORIDE 25 MG/ML
12.5 INJECTION INTRAMUSCULAR; INTRAVENOUS; SUBCUTANEOUS
Status: DISCONTINUED | OUTPATIENT
Start: 2018-08-08 | End: 2018-08-08 | Stop reason: HOSPADM

## 2018-08-08 RX ORDER — OXYCODONE HYDROCHLORIDE 5 MG/1
5-10 TABLET ORAL EVERY 4 HOURS PRN
Status: DISCONTINUED | OUTPATIENT
Start: 2018-08-08 | End: 2018-08-08 | Stop reason: HOSPADM

## 2018-08-08 RX ORDER — OXYCODONE HYDROCHLORIDE 5 MG/1
5-10 TABLET ORAL
Qty: 30 TABLET | Refills: 0 | Status: SHIPPED | OUTPATIENT
Start: 2018-08-08 | End: 2018-08-10

## 2018-08-08 RX ORDER — PROPOFOL 10 MG/ML
INJECTION, EMULSION INTRAVENOUS PRN
Status: DISCONTINUED | OUTPATIENT
Start: 2018-08-08 | End: 2018-08-08

## 2018-08-08 RX ORDER — ONDANSETRON 2 MG/ML
INJECTION INTRAMUSCULAR; INTRAVENOUS PRN
Status: DISCONTINUED | OUTPATIENT
Start: 2018-08-08 | End: 2018-08-08

## 2018-08-08 RX ORDER — LIDOCAINE HYDROCHLORIDE 20 MG/ML
INJECTION, SOLUTION INFILTRATION; PERINEURAL PRN
Status: DISCONTINUED | OUTPATIENT
Start: 2018-08-08 | End: 2018-08-08

## 2018-08-08 RX ORDER — HYDRALAZINE HYDROCHLORIDE 20 MG/ML
2.5-5 INJECTION INTRAMUSCULAR; INTRAVENOUS EVERY 10 MIN PRN
Status: DISCONTINUED | OUTPATIENT
Start: 2018-08-08 | End: 2018-08-08 | Stop reason: HOSPADM

## 2018-08-08 RX ORDER — NALOXONE HYDROCHLORIDE 0.4 MG/ML
.1-.4 INJECTION, SOLUTION INTRAMUSCULAR; INTRAVENOUS; SUBCUTANEOUS
Status: DISCONTINUED | OUTPATIENT
Start: 2018-08-08 | End: 2018-08-08 | Stop reason: HOSPADM

## 2018-08-08 RX ORDER — FENTANYL CITRATE 50 UG/ML
INJECTION, SOLUTION INTRAMUSCULAR; INTRAVENOUS PRN
Status: DISCONTINUED | OUTPATIENT
Start: 2018-08-08 | End: 2018-08-08

## 2018-08-08 RX ADMIN — FENTANYL CITRATE 50 MCG: 50 INJECTION, SOLUTION INTRAMUSCULAR; INTRAVENOUS at 15:04

## 2018-08-08 RX ADMIN — HYDROMORPHONE HYDROCHLORIDE 0.5 MG: 1 INJECTION, SOLUTION INTRAMUSCULAR; INTRAVENOUS; SUBCUTANEOUS at 14:42

## 2018-08-08 RX ADMIN — Medication 0.5 MG: at 15:13

## 2018-08-08 RX ADMIN — PROPOFOL 50 MG: 10 INJECTION, EMULSION INTRAVENOUS at 14:02

## 2018-08-08 RX ADMIN — BUPIVACAINE HYDROCHLORIDE AND EPINEPHRINE BITARTRATE 30 ML: 5; .005 INJECTION, SOLUTION SUBCUTANEOUS at 14:39

## 2018-08-08 RX ADMIN — SODIUM CHLORIDE, POTASSIUM CHLORIDE, SODIUM LACTATE AND CALCIUM CHLORIDE: 600; 310; 30; 20 INJECTION, SOLUTION INTRAVENOUS at 13:47

## 2018-08-08 RX ADMIN — ONDANSETRON 4 MG: 2 INJECTION INTRAMUSCULAR; INTRAVENOUS at 14:14

## 2018-08-08 RX ADMIN — FENTANYL CITRATE 50 MCG: 50 INJECTION, SOLUTION INTRAMUSCULAR; INTRAVENOUS at 13:59

## 2018-08-08 RX ADMIN — OXYCODONE HYDROCHLORIDE 10 MG: 5 TABLET ORAL at 15:55

## 2018-08-08 RX ADMIN — DEXAMETHASONE SODIUM PHOSPHATE 10 MG: 10 INJECTION, SOLUTION INTRAMUSCULAR; INTRAVENOUS at 14:14

## 2018-08-08 RX ADMIN — FENTANYL CITRATE 50 MCG: 50 INJECTION, SOLUTION INTRAMUSCULAR; INTRAVENOUS at 13:57

## 2018-08-08 RX ADMIN — LIDOCAINE HYDROCHLORIDE 100 MG: 20 INJECTION, SOLUTION INFILTRATION; PERINEURAL at 14:00

## 2018-08-08 RX ADMIN — PROPOFOL 200 MG: 10 INJECTION, EMULSION INTRAVENOUS at 14:01

## 2018-08-08 RX ADMIN — CEFAZOLIN SODIUM 3 G: 1 INJECTION, POWDER, FOR SOLUTION INTRAMUSCULAR; INTRAVENOUS at 14:10

## 2018-08-08 RX ADMIN — FENTANYL CITRATE 50 MCG: 50 INJECTION, SOLUTION INTRAMUSCULAR; INTRAVENOUS at 14:56

## 2018-08-08 RX ADMIN — KETOROLAC TROMETHAMINE 30 MG: 30 INJECTION, SOLUTION INTRAMUSCULAR at 14:34

## 2018-08-08 RX ADMIN — MIDAZOLAM 2 MG: 1 INJECTION INTRAMUSCULAR; INTRAVENOUS at 13:56

## 2018-08-08 RX ADMIN — LIDOCAINE HYDROCHLORIDE 1 ML: 10 INJECTION, SOLUTION EPIDURAL; INFILTRATION; INTRACAUDAL; PERINEURAL at 13:47

## 2018-08-08 ASSESSMENT — LIFESTYLE VARIABLES: TOBACCO_USE: 1

## 2018-08-08 NOTE — DISCHARGE INSTRUCTIONS
General Knee Arthroscopy Discharge Instructions    [phone]  Bone and Joint Service Line for issues or concerns    Home Prescriptions:  Oxycodone 5 mg tablets:  Take one or two tablets every 4 hrs as needed for pain..  Tylenol :  You may take one or two tablets (325mg) every six hours as needed.    Please an over the counter stool softener if he uses the oxycodone.      General Care:  After surgery you may feel tired/sleepy. This is normal. Please have someone stay with you for 24 hours after surgery. You should avoid driving for 1-2 days after surgery, as your reaction time may be slow. You should not drive at all if you have had surgery on your arms, right leg and/or are taking narcotic pain medications until released by your doctor. If you have any question along the way please contact the office. If you feel it is an issue cannot wait for normal office hours, contact the on-call physician.    Bandages:   Change your bandage after the first 48 hours. You may use Band-Aids or sterile gauze with a small amount of tape. It s normal to have some blood-tinged fluid on your bandages, this will usually continue for the first day or two. Keep the area clean and dry. Do not apply any ointments.   I ask that you re apply the ace bandage as it was originally wrapped from your toes up to your groin.  This offers uniform compression and helps to keep the leg and knee from swelling.  You may take this off at night, and re wrap it frequently during the day.     Bathing:  Do not submerge your incision in water such as a bath or pool. It is ok to shower after removing your initial bandage after the first 2 days from surgery. Avoid any excessively hot showers, baths, or hot tubes after surgery.     Follow up:  Your follow up appointment should already be scheduled. If its not, please call the office to verify an appointment 10 days after surgery.     Diet:  Start with non-alcoholic liquids at first, particularly water or  sports drinks after surgery. Progress to bland foods such as crackers and bread and finally to your normal diet if you have no problems.     Pain control:  Take your pain medications as prescribed. These medications may make you sleepy. Do not drive, operate equipment, or drink alcohol when taking these.  You may take Tylenol (Generic name is acetaminophen) as directed on the bottle for additional relief or in place of the prescribed pain medications as your pain gets better. Ibuprofen,and Aleve can be used in supplement to the pain prescription and tylenol if you need . If the medications cause a reaction such as nausea or skin rash, stop taking them and contact your doctor. Please plan accordingly, pain medications will not be re-filled on the weekends or at night. Call the office during the day if you need more medications.    Crutches:  Use crutches/walker/cane as needed after surgery. You can stop using these when you feel stable on your feet.     Braces:  Some surgeries will require the use of a brace. Use this brace as directed.         Physical Therapy:  Depending on your surgery, physical therapy may start within a few days or be delayed 4-6 weeks. At your first post-operative visit, your doctor will direct you on your personal therapy program if needed.     Activity:  Unless otherwise instructed, you can weight bear as tolerated. While laying or sitting down you should straighten your knee all the way out and then gently work on bending the knee back. Do not worry at first if your knee feels stiff and will not bend like normal, this will get better.     Normal findings after surgery:  Numbness and tenderness around the incisions is normal.  You may have bruising around the incisions.  Your knee will be swollen for 3 weeks after surgery. It will feel  tight  to move.   Low grade fevers less than 100.5 degrees Fahrenheit are normal.       When to call the Office:  Temperature greater than 101.5 degrees  Fahreheit.  Fever, chills, and increasing pain in the knee.  Excessive drainage from the incisions that include bright red blood.  Drainage from the incisions sites that appear yellow, pus-like, or foul smelling.  Increasing pain the knee not relieved by the prescribed pain medications or ice.  Persistent nausea or vomiting  Pain or swelling in your calf area (in back above your ankle)  Any other effects you feel are significant.      KNEE EXERCISES  Start leg exercises first day after surgery (see last page).  These exercises help maintain muscle tone in your leg and strengthen the muscles supporting the knee.  Do them a minimum of 3 times a day; 20 times each.  If you have questions about the exercises or problems doing them please contact your doctor.          KNEE SURGERY - HOME EXERCISE PROGRAM    All exercises to be performed at least 3 times per day.      Quad Sets    Sit with leg extended    Tighten quad muscles in front of leg, trying to    push back of knee downward    Hold exercise for 10 seconds    Rest 10 seconds between reps    Perform 1 set of 20 reps, 3 times a day      Heel Slides     Lie on back with legs straight    Slide heel to buttocks     Return to start position    Repeat with other leg    Perform 1 rep every 4 seconds    Perform 3 sets of 20 reps, 3 times a day    Rest 1 minute between sets      Ankle Pumps     Lie on back with foot elevated on pillow    Move foot up and down, pumping ankle    Perform 3 sets of 20 reps, 3 times a day    Perform 1 rep every 4 seconds    Rest  1 minute between sets          Straight Leg Raise    Lie on back with uninvolved knee bent    Raise straight leg to thigh level of bend leg    Return to starting position    Perform 3 sets of 20 reps, 3 times a day    Perform 1 rep every 4 seconds    Rest 1 minute between sets           Remember to get your knee completely straight.    Same-Day Surgery   Adult Discharge Orders & Instructions     For 24 hours after  surgery    1. Get plenty of rest.  A responsible adult must stay with you for at least 24 hours after you leave the hospital.   2. Do not drive or use heavy equipment.  If you have weakness or tingling, don't drive or use heavy equipment until this feeling goes away.  3. Do not drink alcohol.  4. Avoid strenuous or risky activities.  Ask for help when climbing stairs.   5. You may feel lightheaded.  If so, sit for a few minutes before standing.  Have someone help you get up.   6. You may have a slight fever. Call the doctor if your fever is over 100 F (37.7 C) (taken under the tongue) or lasts longer than 24 hours.  7. You may have a dry mouth, a sore throat, muscle aches or trouble sleeping.  These should go away after 24 hours.  8. Do not make important or legal decisions.  Based on the surgery/procedure that you had today, we do not expect that you will have any problems.  However, we want you to know what to do if you have pain, nausea, bleeding,or infection:  To control pain:  Take medicines your physician has prescribed or or over-the counter medicine he or she advises.  Ice packs and periods of rest are often helpful.  For surgery on an arm or leg, raise it on a pillow to ease swelling.  If your pain is not managed with the above methods, contact your physician.  To control nausea:  Take anti-nausea medicine approved by your physician.  Drink clear liquids such as apple juice, ginger ale, broth or 7-Up. Be sure to drink enough fluids.  Move to a regular diet as you feel able.  Rest may also help.  Bleeding:  You may see a little blood on your dressing, about the size of a quarter in the first 24 hours.  If you see this, there is no reason to be alarmed.  However, if this continues to increase in size, apply pressure if able, and notify your physician.  Infection: Please contact your physician if you have any of the following signs:  redness, swelling, heat, increasing pain or foul-smelling drainage at your  surgery site, fever or chills.    Call your doctor for any of the followin.  It has been over 8 to 10 hours since surgery and you are still not able to urinate (pass water).    Nurse advice line: [phone]

## 2018-08-08 NOTE — IP AVS SNAPSHOT
MRN:[id number]                      After Visit Summary   8/8/2018    Maco Chen    MRN: [id number]           Thank you!     Thank you for choosing [facility] for your care. Our goal is always to provide you with excellent care. Hearing back from our patients is one way we can continue to improve our services. Please take a few minutes to complete the written survey that you may receive in the mail after you visit with us. Thank you!        Patient Information     Date Of Birth          1990        About your hospital stay     You were admitted on:  August 8, 2018 You last received care in the:  [facility]    You were discharged on:  August 8, 2018       Who to Call     For medical emergencies, please call 911.  For non-urgent questions about your medical care, please call your primary care provider or clinic, None  For questions related to your surgery, please call your surgery clinic        Attending Provider     Provider Specialty    Miguel Brody MD Orthopedics       Primary Care Provider Fax #    Physician No Ref-Primary [phone]      After Care Instructions     Discharge Instructions       Review outpatient procedure discharge instructions with patient as directed by Provider            Dressing Change        IF leaking, remove and redress. Wash hands before and after and use gloves.            Return to clinic       Return to clinic in 10 days.            Weight bearing - As tolerated                 Your next 10 appointments already scheduled     Aug 21, 2018  9:30 AM CDT   [facility] Visit with Miguel Brody MD   [facility]    [address] [place] [id number] [phone]              Further instructions from your care team       General Knee Arthroscopy Discharge Instructions    [phone]  Bone and Joint Service Line for issues or concerns    Home Prescriptions:  Oxycodone 5 mg  tablets:  Take one or two tablets every 4 hrs as needed for pain..  Tylenol :  You may take one or two tablets (325mg) every six hours as needed.    Please an over the counter stool softener if he uses the oxycodone.      General Care:  After surgery you may feel tired/sleepy. This is normal. Please have someone stay with you for 24 hours after surgery. You should avoid driving for 1-2 days after surgery, as your reaction time may be slow. You should not drive at all if you have had surgery on your arms, right leg and/or are taking narcotic pain medications until released by your doctor. If you have any question along the way please contact the office. If you feel it is an issue cannot wait for normal office hours, contact the on-call physician.    Bandages:   Change your bandage after the first 48 hours. You may use Band-Aids or sterile gauze with a small amount of tape. It s normal to have some blood-tinged fluid on your bandages, this will usually continue for the first day or two. Keep the area clean and dry. Do not apply any ointments.   I ask that you re apply the ace bandage as it was originally wrapped from your toes up to your groin.  This offers uniform compression and helps to keep the leg and knee from swelling.  You may take this off at night, and re wrap it frequently during the day.     Bathing:  Do not submerge your incision in water such as a bath or pool. It is ok to shower after removing your initial bandage after the first 2 days from surgery. Avoid any excessively hot showers, baths, or hot tubes after surgery.     Follow up:  Your follow up appointment should already be scheduled. If its not, please call the office to verify an appointment 10 days after surgery.     Diet:  Start with non-alcoholic liquids at first, particularly water or sports drinks after surgery. Progress to bland foods such as crackers and bread and finally to your normal diet if you have no problems.     Pain control:  Take  your pain medications as prescribed. These medications may make you sleepy. Do not drive, operate equipment, or drink alcohol when taking these.  You may take Tylenol (Generic name is acetaminophen) as directed on the bottle for additional relief or in place of the prescribed pain medications as your pain gets better. Ibuprofen,and Aleve can be used in supplement to the pain prescription and tylenol if you need . If the medications cause a reaction such as nausea or skin rash, stop taking them and contact your doctor. Please plan accordingly, pain medications will not be re-filled on the weekends or at night. Call the office during the day if you need more medications.    Crutches:  Use crutches/walker/cane as needed after surgery. You can stop using these when you feel stable on your feet.     Braces:  Some surgeries will require the use of a brace. Use this brace as directed.         Physical Therapy:  Depending on your surgery, physical therapy may start within a few days or be delayed 4-6 weeks. At your first post-operative visit, your doctor will direct you on your personal therapy program if needed.     Activity:  Unless otherwise instructed, you can weight bear as tolerated. While laying or sitting down you should straighten your knee all the way out and then gently work on bending the knee back. Do not worry at first if your knee feels stiff and will not bend like normal, this will get better.     Normal findings after surgery:  Numbness and tenderness around the incisions is normal.  You may have bruising around the incisions.  Your knee will be swollen for 3 weeks after surgery. It will feel  tight  to move.   Low grade fevers less than 100.5 degrees Fahrenheit are normal.       When to call the Office:  Temperature greater than 101.5 degrees Fahreheit.  Fever, chills, and increasing pain in the knee.  Excessive drainage from the incisions that include bright red blood.  Drainage from the incisions sites  that appear yellow, pus-like, or foul smelling.  Increasing pain the knee not relieved by the prescribed pain medications or ice.  Persistent nausea or vomiting  Pain or swelling in your calf area (in back above your ankle)  Any other effects you feel are significant.      KNEE EXERCISES  Start leg exercises first day after surgery (see last page).  These exercises help maintain muscle tone in your leg and strengthen the muscles supporting the knee.  Do them a minimum of 3 times a day; 20 times each.  If you have questions about the exercises or problems doing them please contact your doctor.          KNEE SURGERY - HOME EXERCISE PROGRAM    All exercises to be performed at least 3 times per day.      Quad Sets    Sit with leg extended    Tighten quad muscles in front of leg, trying to    push back of knee downward    Hold exercise for 10 seconds    Rest 10 seconds between reps    Perform 1 set of 20 reps, 3 times a day      Heel Slides     Lie on back with legs straight    Slide heel to buttocks     Return to start position    Repeat with other leg    Perform 1 rep every 4 seconds    Perform 3 sets of 20 reps, 3 times a day    Rest 1 minute between sets      Ankle Pumps     Lie on back with foot elevated on pillow    Move foot up and down, pumping ankle    Perform 3 sets of 20 reps, 3 times a day    Perform 1 rep every 4 seconds    Rest  1 minute between sets          Straight Leg Raise    Lie on back with uninvolved knee bent    Raise straight leg to thigh level of bend leg    Return to starting position    Perform 3 sets of 20 reps, 3 times a day    Perform 1 rep every 4 seconds    Rest 1 minute between sets           Remember to get your knee completely straight.    Same-Day Surgery   Adult Discharge Orders & Instructions     For 24 hours after surgery    1. Get plenty of rest.  A responsible adult must stay with you for at least 24 hours after you leave the hospital.   2. Do not drive or use heavy equipment.  If  you have weakness or tingling, don't drive or use heavy equipment until this feeling goes away.  3. Do not drink alcohol.  4. Avoid strenuous or risky activities.  Ask for help when climbing stairs.   5. You may feel lightheaded.  If so, sit for a few minutes before standing.  Have someone help you get up.   6. You may have a slight fever. Call the doctor if your fever is over 100 F (37.7 C) (taken under the tongue) or lasts longer than 24 hours.  7. You may have a dry mouth, a sore throat, muscle aches or trouble sleeping.  These should go away after 24 hours.  8. Do not make important or legal decisions.  Based on the surgery/procedure that you had today, we do not expect that you will have any problems.  However, we want you to know what to do if you have pain, nausea, bleeding,or infection:  To control pain:  Take medicines your physician has prescribed or or over-the counter medicine he or she advises.  Ice packs and periods of rest are often helpful.  For surgery on an arm or leg, raise it on a pillow to ease swelling.  If your pain is not managed with the above methods, contact your physician.  To control nausea:  Take anti-nausea medicine approved by your physician.  Drink clear liquids such as apple juice, ginger ale, broth or 7-Up. Be sure to drink enough fluids.  Move to a regular diet as you feel able.  Rest may also help.  Bleeding:  You may see a little blood on your dressing, about the size of a quarter in the first 24 hours.  If you see this, there is no reason to be alarmed.  However, if this continues to increase in size, apply pressure if able, and notify your physician.  Infection: Please contact your physician if you have any of the following signs:  redness, swelling, heat, increasing pain or foul-smelling drainage at your surgery site, fever or chills.    Call your doctor for any of the followin.  It has been over 8 to 10 hours since surgery and you are still not able to urinate (pass  water).    Nurse advice line: [phone]        Pending Results     No orders found from 8/6/2018 to 8/9/2018.            Admission Information     Date & Time Provider Department Dept. Phone    8/8/2018 Miguel Brody MD [facility] Phase [phone]      Your Vitals Were     Blood Pressure Temperature Respirations Pulse Oximetry          140/76 98.9  F (37.2  C) (Oral) 10 94%        Care EveryWhere ID     This is your Care EveryWhere ID. This could be used by other organizations to access your [facility] medical records  [id number]        Equal Access to Services     [facility] CenterJUAN CARLOS : Hadii tyler boogie hadneeraj Sogilberto, waaxda pinky, qamirna kaemanuel patrick, alison moran . So [facility] [phone].    ATENCIÓN: Si habla español, tiene a tavares disposición servicios gratuitos de asistencia lingüística. Llame al [phone].    We comply with applicable federal civil rights laws and [place] laws. We do not discriminate on the basis of race, color, national origin, age, disability, sex, sexual orientation, or gender identity.               Review of your medicines      START taking        Dose / Directions    oxyCODONE IR 5 MG tablet   Commonly known as:  ROXICODONE   Used for:  Post-op pain        Dose:  5-10 mg   Take 1-2 tablets (5-10 mg) by mouth every 3 hours as needed for pain or other (Moderate to Severe)   Quantity:  30 tablet   Refills:  0         CONTINUE these medicines which have NOT CHANGED        Dose / Directions    diclofenac 75 MG EC tablet   Commonly known as:  VOLTAREN        Dose:  75 mg   Take 1 tablet (75 mg) by mouth 2 times daily as needed for moderate pain   Quantity:  30 tablet   Refills:  0       lisinopril 5 MG tablet   Commonly known as:  PRINIVIL/ZESTRIL   Used for:  Essential hypertension        Dose:  5 mg   Take 1 tablet (5 mg) by mouth daily   Quantity:  90 tablet   Refills:  1       zolpidem 10 MG tablet   Commonly known as:  AMBIEN         Dose:  10 mg   Take 1 tablet (10 mg) by mouth nightly as needed for sleep   Quantity:  30 tablet   Refills:  1            Where to get your medicines      Some of these will need a paper prescription and others can be bought over the counter. Ask your nurse if you have questions.     Bring a paper prescription for each of these medications     oxyCODONE IR 5 MG tablet                Protect others around you: Learn how to safely use, store and throw away your medicines at www.disposemymeds.org.        Information about OPIOIDS     PRESCRIPTION OPIOIDS: WHAT YOU NEED TO KNOW   We gave you an opioid (narcotic) pain medicine. It is important to manage your pain, but opioids are not always the best choice. You should first try all the other options your care team gave you. Take this medicine for as short a time (and as few doses) as possible.    Some activities can increase your pain, such as bandage changes or therapy sessions. It may help to take your pain medicine 30 to 60 minutes before these activities. Reduce your stress by getting enough sleep, working on hobbies you enjoy and practicing relaxation or meditation. Talk to your care team about ways to manage your pain beyond prescription opioids.    These medicines have risks:    DO NOT drive when on new or higher doses of pain medicine. These medicines can affect your alertness and reaction times, and you could be arrested for driving under the influence (DUI). If you need to use opioids long-term, talk to your care team about driving.    DO NOT operate heavy machinery    DO NOT do any other dangerous activities while taking these medicines.    DO NOT drink any alcohol while taking these medicines.     If the opioid prescribed includes acetaminophen, DO NOT take with any other medicines that contain acetaminophen. Read all labels carefully. Look for the word  acetaminophen  or  Tylenol.  Ask your pharmacist if you have questions or are unsure.    You can get  addicted to pain medicines, especially if you have a history of addiction (chemical, alcohol or substance dependence). Talk to your care team about ways to reduce this risk.    All opioids tend to cause constipation. Drink plenty of water and eat foods that have a lot of fiber, such as fruits, vegetables, prune juice, apple juice and high-fiber cereal. Take a laxative (Miralax, milk of magnesia, Colace, Senna) if you don t move your bowels at least every other day. Other side effects include upset stomach, sleepiness, dizziness, throwing up, tolerance (needing more of the medicine to have the same effect), physical dependence and slowed breathing.    Store your pills in a secure place, locked if possible. We will not replace any lost or stolen medicine. If you don t finish your medicine, please throw away (dispose) as directed by your pharmacist. The [place] Pollution [employer] has more information about safe disposal: https://www.pca.[facility].[place].us/living-green/managing-unwanted-medications             Medication List: This is a list of all your medications and when to take them. Check marks below indicate your daily home schedule. Keep this list as a reference.      Medications           Morning Afternoon Evening Bedtime As Needed    diclofenac 75 MG EC tablet   Commonly known as:  VOLTAREN   Take 1 tablet (75 mg) by mouth 2 times daily as needed for moderate pain                                lisinopril 5 MG tablet   Commonly known as:  PRINIVIL/ZESTRIL   Take 1 tablet (5 mg) by mouth daily                                oxyCODONE IR 5 MG tablet   Commonly known as:  ROXICODONE   Take 1-2 tablets (5-10 mg) by mouth every 3 hours as needed for pain or other (Moderate to Severe)                                zolpidem 10 MG tablet   Commonly known as:  AMBIEN   Take 1 tablet (10 mg) by mouth nightly as needed for sleep

## 2018-08-08 NOTE — TELEPHONE ENCOUNTER
Our goal is to have forms completed with 72 hours, however some forms may require a visit or additional information.    Who is the form from?: Patient  Where the form came from: Patient or family brought in     What clinic location was the form placed at?: [facility]  Where the form was placed: [facility]  What number is listed as a contact on the form?: [phone]     Phone call message- patient request for a letter, form or note:    Date needed: as soon as possible  Please call the patient when completed  Has the patient signed a consent form for release of information? YES    Additional comments: please call patient when completed -      Call taken on 8/8/2018 at 1:58 PM by Linette Barry    Type of letter, form or note: disability

## 2018-08-08 NOTE — ANESTHESIA POSTPROCEDURE EVALUATION
Patient: Maco Chen    Procedure(s):  right knee arthroscopy with partial medial meniscus debridement / menisectomy - Wound Class: I-Clean    Diagnosis:right knee meniscus tear  Diagnosis Additional Information: No value filed.    Anesthesia Type:  General, LMA    Note:  Anesthesia Post Evaluation    Patient location during evaluation: Phase 2  Patient participation: Able to fully participate in evaluation  Level of consciousness: awake and alert  Pain management: adequate  Airway patency: patent  Cardiovascular status: blood pressure returned to baseline  Respiratory status: spontaneous ventilation and room air  Hydration status: acceptable  PONV: none     Anesthetic complications: None    Comments: Patient sitting up in chair without complaint at this time. No anesthesia concerns.         Last vitals:  Vitals:    08/08/18 1540 08/08/18 1550 08/08/18 1600   BP: 157/84 139/83 148/77   Resp:   16   Temp:   98.8  F (37.1  C)   SpO2: 95% 95% 95%         Electronically Signed By: GAVIN Araiza CRNA  August 8, 2018  4:28 PM

## 2018-08-08 NOTE — ANESTHESIA PREPROCEDURE EVALUATION
Anesthesia Evaluation     . Pt has had prior anesthetic. Type: General    No history of anesthetic complications          ROS/MED HX    ENT/Pulmonary:     (+)sleep apnea, tobacco use, Current use , . .    Neurologic:  - neg neurologic ROS     Cardiovascular:  - neg cardiovascular ROS   (+) ----. : . . . :. . Previous cardiac testing date:results:date: results:ECG reviewed date:3/16/18 results:Sinus rhythm date: results:          METS/Exercise Tolerance:  >4 METS   Hematologic:  - neg hematologic  ROS       Musculoskeletal:   (+) , , other musculoskeletal- Right knee arthroscopy      GI/Hepatic:     (+) GERD (has symptoms every couple weeks)       Renal/Genitourinary:  - ROS Renal section negative   (+) Pt has no history of transplant,       Endo:     (+) Obesity, .      Psychiatric: Comment: Oppositional defiant disorder    (+) psychiatric history other (comment)      Infectious Disease:  - neg infectious disease ROS       Malignancy:      - no malignancy   Other:    (+) No chance of pregnancy C-spine cleared: N/A, no H/O Chronic Pain,  - neg other ROS                 Physical Exam  Normal systems: cardiovascular, pulmonary and dental    Airway   Mallampati: II  TM distance: >3 FB  Neck ROM: full    Dental     Cardiovascular   Rhythm and rate: regular and normal      Pulmonary    breath sounds clear to auscultation                    Anesthesia Plan      History & Physical Review  History and physical reviewed and following examination; no interval change.    ASA Status:  2 .    NPO Status:  > 8 hours    Plan for General and LMA with Propofol induction. Maintenance will be Balanced.    PONV prophylaxis:  Ondansetron (or other 5HT-3) and Dexamethasone or Solumedrol       Postoperative Care  Postoperative pain management:  IV analgesics and Oral pain medications.      Consents  Anesthetic plan, risks, benefits and alternatives discussed with:  Patient..                          .

## 2018-08-08 NOTE — IP AVS SNAPSHOT
[facility]    [address]     [place] [address]    Phone:  [phone]                                       After Visit Summary   8/8/2018    Maco Chen    MRN: [id number]           After Visit Summary Signature Page     I have received my discharge instructions, and my questions have been answered. I have discussed any challenges I see with this plan with the nurse or doctor.    ..........................................................................................................................................  Patient/Patient Representative Signature      ..........................................................................................................................................  Patient Representative Print Name and Relationship to Patient    ..................................................               ................................................  Date                                            Time    ..........................................................................................................................................  Reviewed by Signature/Title    ...................................................              ..............................................  Date                                                            Time

## 2018-08-08 NOTE — ANESTHESIA CARE TRANSFER NOTE
Patient: Maco Chen    Procedure(s):  right knee arthroscopy with partial medial meniscus debridement / menisectomy - Wound Class: I-Clean    Diagnosis: right knee meniscus tear  Diagnosis Additional Information: No value filed.    Anesthesia Type:   General, LMA     Note:  Airway :Face Mask  Patient transferred to:PACU  Handoff Report: Identifed the Patient, Identified the Reponsible Provider, Reviewed the pertinent medical history, Discussed the surgical course, Reviewed Intra-OP anesthesia mangement and issues during anesthesia, Set expectations for post-procedure period and Allowed opportunity for questions and acknowledgement of understanding      Vitals: (Last set prior to Anesthesia Care Transfer)    CRNA VITALS  8/8/2018 1415 - 8/8/2018 1456      8/8/2018             Pulse: 94    SpO2: 95 %                Electronically Signed By: GAVIN Garcia CRNA  August 8, 2018  2:56 PM

## 2018-08-09 NOTE — OP NOTE
Procedure Date: 08/08/2018      PREOPERATIVE DIAGNOSIS:  Torn medial meniscus, right knee.      POSTOPERATIVE DIAGNOSIS:  Flap tear medial meniscus, right knee.      PROCEDURE:  Arthroscopic evaluation with simple debridement of medial meniscus and debridement of incidental plica.      SURGEON:  Miguel Brody MD      ANESTHESIA:  General with local infiltration of the wounds with 0.5% Marcaine.      INDICATIONS:  Mr. Chen is a 27-year-old gentleman who had knee pain following a subtle injury.  MRI showed that he had what appeared to be a horizontal type tear of the posteromedial meniscus.  There may have been a component involving the rim as well.  Therefore, debridement of the meniscus as possible repair of residual was offered.  He understood the rationale for this approach and he was seen in the preoperative hold area where consent was obtained and the extremity was marked.      DETAILS OF PROCEDURE:  The patient was brought to the operating room, placed supine on table and general anesthesia was induced.  The right knee upper thigh tourniquet was applied, and the right leg was placed in a surgical leg abel.  The left leg was placed onto a pad.  The foot of the table was dropped.  The right knee was prepped and draped in our standard fashion for arthroscopic procedure.  Timeout protocol was followed.  The knee was filled with normal saline through arthrocentesis.  The leg was exsanguinated with an Esmarch and tourniquet was inflated to 325 mmHg.      Superior lateral outflow was established followed by anterolateral arthroscopic portal.  We then rapidly established an anteromedial portal under direct visualization.  The knee was examined in systematic fashion.  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  This was mainly in the medial facet of the patella.  The patella tracked centrally.  There was a fairly large plica fold which actually seemed to have some irritation of the medial femoral  condyle.  Because of this incisional finding, this was debrided.  The lateral compartment showed age-appropriate findings with a normal meniscus.  Cruciate ligament was intact.  The medial compartment showed evidence of an undersurface flap tear which was attached still medially.  One could see a small area along the posterior horn where it had been detached.  This flap was then easily debrided.  Because the posterior horn residual was slightly thinned and at risk for retear, we debrided it with basket forceps and motorized shaver.  This left us perhaps 50% of the rim posteriorly which was very stable.      Following the above, instruments were removed.  Fluid was extruded.  The wounds were infiltrated with 0.5% Marcaine.  They were then closed with figure-of-eight 3-0 nylon.  Dressings of Xeroform, 4 x 4's and sterile Webril applied followed by a [id number] bulky dressing.  The patient was then repositioned, awakened, transferred to the [facility].  He was taken to recovery area, where stable vital signs were noted.         SCOTT LOVE MD             D: 2018   T: 2018   MT: NICHOLE      Name:     SANDRA OJEDA   MRN:      [id number]        Account:        [id number]   :      1990           Procedure Date: 2018      Document: [id number]

## 2018-08-10 ENCOUNTER — TELEPHONE (OUTPATIENT)
Dept: ORTHOPEDICS | Facility: CLINIC | Age: 28
End: 2018-08-10

## 2018-08-10 DIAGNOSIS — G89.18 POST-OP PAIN: ICD-10-CM

## 2018-08-10 NOTE — TELEPHONE ENCOUNTER
Finished what I could. Patient or wife will sign the CAIT today. Will leave at the  of the  specialty center.  Alicia Mckee RN on 8/10/2018 at 9:59 AM

## 2018-08-10 NOTE — TELEPHONE ENCOUNTER
NURSING NOTE    D:  Had arthroscopy with meniscus repair 2 days ago with Dr. Brody. Wife calls sounding very worried. Wife had patient sitting while removing ACE bandage from leg when patient turned white and when unconscious and his body was shaking. She assisted his body to a lying position. Patient was unconscious and shaking for about 30-45 seconds. Meanwhile, she was trying to wake him without response. Patient called writer 5 minutes later. At that time, per wife, patient seemed better. Color returned, talking, awake, patient reports feeling normal again.    A:  Advised her to bring patient to the ED. Writer called ED and gave heads up.    R:  Patient verbalizes understanding and will call back if needed.    Alicia Mckee RN  [facility]  8/10/2018 1:53 PM

## 2018-08-10 NOTE — TELEPHONE ENCOUNTER
Oxycodone       Last Written Prescription Date:  8/8/2018  Last Fill Quantity: 30,   # refills: 0  Last Office Visit: 8/8/2018  Future Office visit:    Next 5 appointments (look out 90 days)     Aug 21, 2018  9:30 AM CDT   [facility] with Miguel [facility]dy MD   [facility]    [address] [place] [address]   [phone]                   Routing refill request to provider for review/approval because:  Drug not on the [facility], [facility] or [facility] refill protocol or controlled substance

## 2018-08-13 RX ORDER — OXYCODONE HYDROCHLORIDE 5 MG/1
5-10 TABLET ORAL
Qty: 30 TABLET | Refills: 0 | Status: SHIPPED | OUTPATIENT
Start: 2018-08-13 | End: 2018-09-04

## 2018-08-13 NOTE — TELEPHONE ENCOUNTER
Sent to [facility].  Patient should continue to decrease use of medication.

## 2018-08-21 ENCOUNTER — OFFICE VISIT (OUTPATIENT)
Dept: ORTHOPEDICS | Facility: CLINIC | Age: 28
End: 2018-08-21
Payer: COMMERCIAL

## 2018-08-21 VITALS
HEIGHT: 73 IN | BODY MASS INDEX: 38.7 KG/M2 | HEART RATE: 99 BPM | DIASTOLIC BLOOD PRESSURE: 86 MMHG | WEIGHT: 292 LBS | SYSTOLIC BLOOD PRESSURE: 153 MMHG

## 2018-08-21 DIAGNOSIS — S83.241A OTHER TEAR OF MEDIAL MENISCUS OF RIGHT KNEE AS CURRENT INJURY, INITIAL ENCOUNTER: Primary | ICD-10-CM

## 2018-08-21 DIAGNOSIS — Z98.890 STATUS POST ARTHROSCOPY OF RIGHT KNEE: ICD-10-CM

## 2018-08-21 PROCEDURE — 99024 POSTOP FOLLOW-UP VISIT: CPT | Performed by: ORTHOPAEDIC SURGERY

## 2018-08-21 ASSESSMENT — PAIN SCALES - GENERAL: PAINLEVEL: MODERATE PAIN (4)

## 2018-08-21 NOTE — PROGRESS NOTES
"HISTORY OF PRESENT ILLNESS:    Maco Chen is a 27 year old male who is seen in follow up for   Chief Complaint   Patient presents with     RECHECK     Right knee arthroscopy with partial medial meniscus debridement / menisectomy.  DOS: 8/8/18 (13 days postop)     Surgical Followup     PREOPERATIVE DIAGNOSIS:  Torn medial meniscus, right knee.POSTOPERATIVE DIAGNOSIS:  Flap tear medial meniscus, right knee. PROCEDURE:  Arthroscopic evaluation with simple debridement of medial meniscus and debridement of incidental plica.      Present symptoms: Still has some aching behind his knee.  Treatments tried to this point: First postsurgical visit.  He has been doing home exercises.    PHYSICAL EXAM:  /86 (BP Location: Left arm, Patient Position: Chair, Cuff Size: Adult Large)  Pulse 99  Ht 1.854 m (6' 1\")  Wt 132.5 kg (292 lb)  BMI 38.52 kg/m2  Body mass index is 38.52 kg/(m^2).       Physical Exam:  Vitals: /86 (BP Location: Left arm, Patient Position: Chair, Cuff Size: Adult Large)  Pulse 99  Ht 1.854 m (6' 1\")  Wt 132.5 kg (292 lb)  BMI 38.52 kg/m2  BMI= Body mass index is 38.52 kg/(m^2).  Constitutional: healthy, alert and no acute distress   Psychiatric: mentation appears normal and affect normal/bright    JOINT/EXTREMITIES:  NEURO: no focal deficits  Affected extremity pulses are easily palpable.  SKIN: no excoriation or erythema. No signs of infection.    Sutures were removed as needed.    GAIT: Has a fairly smooth gait pattern.        Swelling: Residual soft tissue swelling.  Bruising: No obvious bruising.  ROM: Terminal extension is a little bit tough for him to obtain due to some posterior knee discomfort.  Flexion  is near normal.      IMAGING INTERPRETATION: No x-rays were taken.   ASSESSMENT:    ICD-10-CM    1. Other tear of medial meniscus of right knee as current injury, initial encounter S83.241A    2. Status post arthroscopy of right knee Z98.890        He appears to be making " appropriate progress.    PLAN:      Edema control reviewed.  Pain medication usage reviewed.  Did not request a refill.  Physical Therapy: At at some home exercise program today.  Return to Work: Would like to return to work but I feel he should have limitations.  Return to work was written for next Monday.  He will be limited to 4 hours standing walking and carrying.  Should only be doing light lifting.      Return to clinic 3, weeks    Miguel Brody MD

## 2018-08-21 NOTE — LETTER
"    8/21/2018         RE: Maco Chen  [address]  [facility] [address]        Dear Colleague,    Thank you for referring your patient, Maco Chen, to the [facility]. Please see a copy of my visit note below.    HISTORY OF PRESENT ILLNESS:    Maco Chen is a 27 year old male who is seen in follow up for   Chief Complaint   Patient presents with     RECHECK     Right knee arthroscopy with partial medial meniscus debridement / menisectomy.  DOS: 8/8/18 (13 days postop)     Surgical Followup     PREOPERATIVE DIAGNOSIS:  Torn medial meniscus, right knee.POSTOPERATIVE DIAGNOSIS:  Flap tear medial meniscus, right knee. PROCEDURE:  Arthroscopic evaluation with simple debridement of medial meniscus and debridement of incidental plica.      Present symptoms: Still has some aching behind his knee.  Treatments tried to this point: First postsurgical visit.  He has been doing home exercises.    PHYSICAL EXAM:  /86 (BP Location: Left arm, Patient Position: Chair, Cuff Size: Adult Large)  Pulse 99  Ht 1.854 m (6' 1\")  Wt 132.5 kg (292 lb)  BMI 38.52 kg/m2  Body mass index is 38.52 kg/(m^2).       Physical Exam:  Vitals: /86 (BP Location: Left arm, Patient Position: Chair, Cuff Size: Adult Large)  Pulse 99  Ht 1.854 m (6' 1\")  Wt 132.5 kg (292 lb)  BMI 38.52 kg/m2  BMI= Body mass index is 38.52 kg/(m^2).  Constitutional: healthy, alert and no acute distress   Psychiatric: mentation appears normal and affect normal/bright    JOINT/EXTREMITIES:  NEURO: no focal deficits  Affected extremity pulses are easily palpable.  SKIN: no excoriation or erythema. No signs of infection.    Sutures were removed as needed.    GAIT: Has a fairly smooth gait pattern.        Swelling: Residual soft tissue swelling.  Bruising: No obvious bruising.  ROM: Terminal extension is a little bit tough for him to obtain due to some posterior knee discomfort.  Flexion  is near " normal.      IMAGING INTERPRETATION: No x-rays were taken.   ASSESSMENT:    ICD-10-CM    1. Other tear of medial meniscus of right knee as current injury, initial encounter S83.241A    2. Status post arthroscopy of right knee Z98.890        He appears to be making appropriate progress.    PLAN:      Edema control reviewed.  Pain medication usage reviewed.  Did not request a refill.  Physical Therapy: At at some home exercise program today.  Return to Work: Would like to return to work but I feel he should have limitations.  Return to work was written for next Monday.  He will be limited to 4 hours standing walking and carrying.  Should only be doing light lifting.      Return to clinic 3, weeks    Miguel Brody MD            Again, thank you for allowing me to participate in the care of your patient.        Sincerely,        Miguel Brody MD

## 2018-08-21 NOTE — MR AVS SNAPSHOT
"              After Visit Summary   8/21/2018    Maco Chen    MRN: [id number]           Patient Information     Date Of Birth          1990        Visit Information        Provider Department      8/21/2018 9:30 AM Miguel Brody MD [facility]         Follow-ups after your visit        Who to contact     If you have questions or need follow up information about today's clinic visit or your schedule please contact [facility] directly at [phone].  Normal or non-critical lab and imaging results will be communicated to you by MyChart, letter or phone within 4 business days after the clinic has received the results. If you do not hear from us within 7 days, please contact the clinic through MyChart or phone. If you have a critical or abnormal lab result, we will notify you by phone as soon as possible.  Submit refill requests through [facility] or call your pharmacy and they will forward the refill request to us. Please allow 3 business days for your refill to be completed.          Additional Information About Your Visit        Care EveryWhere ID     This is your Care EveryWhere ID. This could be used by other organizations to access your [place] medical records  [id number]        Your Vitals Were     Pulse Height BMI (Body Mass Index)             99 1.854 m (6' 1\") 38.52 kg/m2          Blood Pressure from Last 3 Encounters:   08/21/18 153/86   08/08/18 148/77   07/25/18 132/63    Weight from Last 3 Encounters:   08/21/18 132.5 kg (292 lb)   07/19/18 127.5 kg (281 lb 1.6 oz)   07/17/18 129.3 kg (285 lb)              Today, you had the following     No orders found for display       Primary Care Provider Fax #    Physician No Ref-Primary [phone]       No address on file        Equal Access to Services     RADHA JO AH: Tonia Gardner, joyce vance, alison ballard. So walinda " [phone].    ATENCIÓN: Si wolf adam, tiene a tavares disposición servicios gratuitos de asistencia lingüística. Clara martinez [phone].    We comply with applicable federal civil rights laws and [place] laws. We do not discriminate on the basis of race, color, national origin, age, disability, sex, sexual orientation, or gender identity.            Thank you!     Thank you for choosing [facility]  for your care. Our goal is always to provide you with excellent care. Hearing back from our patients is one way we can continue to improve our services. Please take a few minutes to complete the written survey that you may receive in the mail after your visit with us. Thank you!             Your Updated Medication List - Protect others around you: Learn how to safely use, store and throw away your medicines at www.disposemymeds.org.          This list is accurate as of 8/21/18  9:34 AM.  Always use your most recent med list.                   Brand Name Dispense Instructions for use Diagnosis    diclofenac 75 MG EC tablet    VOLTAREN    30 tablet    Take 1 tablet (75 mg) by mouth 2 times daily as needed for moderate pain        lisinopril 5 MG tablet    PRINIVIL/ZESTRIL    90 tablet    Take 1 tablet (5 mg) by mouth daily    Essential hypertension       oxyCODONE IR 5 MG tablet    ROXICODONE    30 tablet    Take 1-2 tablets (5-10 mg) by mouth every 3 hours as needed for pain or other (Moderate to Severe)    Post-op pain       zolpidem 10 MG tablet    AMBIEN    30 tablet    Take 1 tablet (10 mg) by mouth nightly as needed for sleep

## 2018-08-21 NOTE — LETTER
[address] [address]  [phone]          August 21, 2018    RE:  Maco Chen                                                                                                                                                       [address] [facility] [address]            To whom it may concern:    Maco Chen is under my professional care for    Other tear of medial meniscus of right knee as current injury, initial encounter  Status post arthroscopy of right knee     He may return to work at this time.  I recommend the following limitations.  Standing count work should be limited to 4 hours per day.  Be doing no heavy lifting or carrying.    He will be reevaluated in the office in 3 weeks.      Sincerely,        Miguel Brody MD

## 2018-09-04 ENCOUNTER — OFFICE VISIT (OUTPATIENT)
Dept: FAMILY MEDICINE | Facility: CLINIC | Age: 28
End: 2018-09-04
Payer: COMMERCIAL

## 2018-09-04 VITALS
OXYGEN SATURATION: 93 % | DIASTOLIC BLOOD PRESSURE: 84 MMHG | SYSTOLIC BLOOD PRESSURE: 138 MMHG | BODY MASS INDEX: 37.34 KG/M2 | WEIGHT: 283 LBS | RESPIRATION RATE: 24 BRPM | TEMPERATURE: 97.7 F | HEART RATE: 106 BPM

## 2018-09-04 DIAGNOSIS — F17.200 TOBACCO USE DISORDER: ICD-10-CM

## 2018-09-04 DIAGNOSIS — J20.9 ACUTE BRONCHITIS WITH SYMPTOMS > 10 DAYS: Primary | ICD-10-CM

## 2018-09-04 PROCEDURE — 99213 OFFICE O/P EST LOW 20 MIN: CPT | Performed by: NURSE PRACTITIONER

## 2018-09-04 RX ORDER — CEFUROXIME AXETIL 500 MG/1
500 TABLET ORAL 2 TIMES DAILY
Qty: 20 TABLET | Refills: 0 | Status: SHIPPED | OUTPATIENT
Start: 2018-09-04 | End: 2019-01-22

## 2018-09-04 ASSESSMENT — PAIN SCALES - GENERAL: PAINLEVEL: MODERATE PAIN (5)

## 2018-09-04 NOTE — PROGRESS NOTES
SUBJECTIVE:   Maco Chen is a 27 year old male who presents to clinic today for the following health issues:      Acute Illness   Acute illness concerns: cough  Onset: 1 1/2 weeks    Fever: no    Chills/Sweats: yes    Headache (location?): YES- from coughing?    Sinus Pressure:no    Conjunctivitis:  no    Ear Pain: YES: left    Rhinorrhea: YES    Congestion: YES    Sore Throat: YES- coughing?     Cough: YES-non-productive, slight    Wheeze: YES    Decreased Appetite: YES    Nausea: no    Vomiting: no    Diarrhea:  YES    Dysuria/Freq.: no    Fatigue/Achiness: no    Sick/Strep Exposure: no     Therapies Tried and outcome: dayquil, tussin DM, mucinex not helping. Amoxicillin that was left over      The patient reports having a tight nonproductive cough for almost 2 weeks.  He initially had cold symptoms of congestion, but states that cleared up.  He does have some residual hearing deficit in the left ear, no pain or drainage.  He has tried over-the-counter products without improvement.  He is a cigarette smoker, states he is only having 2 or 3 cigarettes a day since being ill.  In the past he tried taking Chantix which caused nightmares, Wellbutrin was not helpful.      Problem list and histories reviewed & adjusted, as indicated.  Additional history: as documented    BP Readings from Last 3 Encounters:   09/04/18 138/84   08/21/18 153/86   08/08/18 148/77    Wt Readings from Last 3 Encounters:   09/04/18 283 lb (128.4 kg)   08/21/18 292 lb (132.5 kg)   07/19/18 281 lb 1.6 oz (127.5 kg)                    Reviewed and updated as needed this visit by clinical staff       Reviewed and updated as needed this visit by Provider         ROS:  Constitutional, HEENT, cardiovascular, pulmonary, gi and gu systems are negative, except as otherwise noted.    OBJECTIVE:     /84  Pulse 106  Temp 97.7  F (36.5  C) (Temporal)  Resp 24  Wt 283 lb (128.4 kg)  SpO2 93%  BMI 37.34 kg/m2  Body mass index is 37.34  kg/(m^2).   GENERAL: Obese young male in no acute distress  NECK: no adenopathy, no asymmetry, masses, or scars and thyroid normal to palpation  RESP: Scattered high-pitched inspiratory/expiratory wheezes and rhonchi throughout the lung fields bilaterally.  He does have air exchange into the bases, I do not note any crackles.  CV: regular rate and rhythm, normal S1 S2, no S3 or S4, no murmur, click or rub, no peripheral edema and peripheral pulses strong        ASSESSMENT/PLAN:     Problem List Items Addressed This Visit        Medium    Tobacco use disorder      Other Visit Diagnoses     Acute bronchitis with symptoms > 10 days    -  Primary    Relevant Medications    cefuroxime (CEFTIN) 500 MG tablet         Ceftin 500 mg 1 tablet twice daily for 10 days  Increase oral fluids  He has a Ventolin inhaler at home.  Instructed to take 2 inhalations every 4-6 hours as needed for spasmodic cough or wheezing.  Cautioned against overuse.  Smoking cessation strongly encouraged.  Prescription for nicotine patches was offered but declined.  Patient states he is going to try quitting on his own  Follow-up in clinic in 2 weeks to recheck lungs, sooner if not improving    GAVIN Raymundo [facility]

## 2018-09-04 NOTE — MR AVS SNAPSHOT
After Visit Summary   9/4/2018    Maco Chen    MRN: [id number]           Patient Information     Date Of Birth          1990        Visit Information        Provider Department      9/4/2018 2:30 PM Genesis Galeano APRN CNP [facility]        Today's Diagnoses     Acute bronchitis with symptoms > 10 days    -  1    Tobacco use disorder           Follow-ups after your visit        Follow-up notes from your care team     Return in about 2 weeks (around 9/18/2018) for Recheck lungs.      Your next 10 appointments already scheduled     Sep 11, 2018  7:30 AM CDT   Return Visit with Miguel Brody MD   [facility]    [address] [place] [id number] [phone]              Who to contact     If you have questions or need follow up information about today's clinic visit or your schedule please contact [facility] directly at [phone].  Normal or non-critical lab and imaging results will be communicated to you by MyChart, letter or phone within 4 business days after the clinic has received the results. If you do not hear from us within 7 days, please contact the clinic through MyChart or phone. If you have a critical or abnormal lab result, we will notify you by phone as soon as possible.  Submit refill requests through [facility] or call your pharmacy and they will forward the refill request to us. Please allow 3 business days for your refill to be completed.          Additional Information About Your Visit        Care EveryWhere ID     This is your Care EveryWhere ID. This could be used by other organizations to access your [place] medical records  [id number]        Your Vitals Were     Pulse Temperature Respirations Pulse Oximetry BMI (Body Mass Index)       106 97.7  F (36.5  C) (Temporal) 24 93% 37.34 kg/m2        Blood Pressure from Last 3 Encounters:   09/04/18 138/84   08/21/18  153/86   08/08/18 148/77    Weight from Last 3 Encounters:   09/04/18 283 lb (128.4 kg)   08/21/18 292 lb (132.5 kg)   07/19/18 281 lb 1.6 oz (127.5 kg)              Today, you had the following     No orders found for display         Today's Medication Changes          These changes are accurate as of 9/4/18  3:15 PM.  If you have any questions, ask your nurse or doctor.               Start taking these medicines.        Dose/Directions    cefuroxime 500 MG tablet   Commonly known as:  CEFTIN   Used for:  Acute bronchitis with symptoms > 10 days   Started by:  Genesis Galeano APRN CNP        Dose:  500 mg   Take 1 tablet (500 mg) by mouth 2 times daily   Quantity:  20 tablet   Refills:  0            Where to get your medicines      These medications were sent to [facility] [address]  [address] [facility] [address]     Phone:  [phone]     cefuroxime 500 MG tablet                Primary Care Provider Fax #    Physician No Ref-Primary [phone]       No address on file        Equal Access to Services     [facility]: Hadsherrill Gardner, waaxda lulucadaha, qaybta kaalmaconchita patrick, alison moran . So [facility] [phone].    ATENCIÓN: Si habla español, tiene a tavares disposición servicios gratuitos de asistencia lingüística. Llame al [phone].    We comply with applicable federal civil rights laws and [place] laws. We do not discriminate on the basis of race, color, national origin, age, disability, sex, sexual orientation, or gender identity.            Thank you!     Thank you for choosing [facility]  for your care. Our goal is always to provide you with excellent care. Hearing back from our patients is one way we can continue to improve our services. Please take a few minutes to complete the written survey that you may receive in the mail after your visit with us. Thank you!             Your Updated Medication List -  Protect others around you: Learn how to safely use, store and throw away your medicines at www.disposemymeds.org.          This list is accurate as of 9/4/18  3:15 PM.  Always use your most recent med list.                   Brand Name Dispense Instructions for use Diagnosis    cefuroxime 500 MG tablet    CEFTIN    20 tablet    Take 1 tablet (500 mg) by mouth 2 times daily    Acute bronchitis with symptoms > 10 days       lisinopril 5 MG tablet    PRINIVIL/ZESTRIL    90 tablet    Take 1 tablet (5 mg) by mouth daily    Essential hypertension       zolpidem 10 MG tablet    AMBIEN    30 tablet    Take 1 tablet (10 mg) by mouth nightly as needed for sleep

## 2018-09-11 ENCOUNTER — OFFICE VISIT (OUTPATIENT)
Dept: ORTHOPEDICS | Facility: CLINIC | Age: 28
End: 2018-09-11
Payer: COMMERCIAL

## 2018-09-11 VITALS
HEIGHT: 73 IN | BODY MASS INDEX: 37.51 KG/M2 | DIASTOLIC BLOOD PRESSURE: 106 MMHG | TEMPERATURE: 99 F | WEIGHT: 283 LBS | SYSTOLIC BLOOD PRESSURE: 158 MMHG

## 2018-09-11 DIAGNOSIS — M22.2X1 PATELLOFEMORAL PAIN SYNDROME OF RIGHT KNEE: Primary | ICD-10-CM

## 2018-09-11 DIAGNOSIS — Z98.890 S/P ARTHROSCOPIC KNEE SURGERY: ICD-10-CM

## 2018-09-11 PROCEDURE — 99024 POSTOP FOLLOW-UP VISIT: CPT | Performed by: ORTHOPAEDIC SURGERY

## 2018-09-11 ASSESSMENT — PAIN SCALES - GENERAL: PAINLEVEL: MODERATE PAIN (5)

## 2018-09-11 NOTE — LETTER
"    9/11/2018         RE: Maco Chen  [address]  [facility] [address]        Dear Colleague,    Thank you for referring your patient, Maco Chen, to the [facility]. Please see a copy of my visit note below.    HISTORY OF PRESENT ILLNESS:    Maco Chen is a 27 year old male who is seen in follow up for   Chief Complaint   Patient presents with     RECHECK     Right knee arthroscopy with partial medial meniscus debridement / menisectomy.  DOS: 8/8/18 (5 weeks postop)     Surgical Followup     PREOPERATIVE DIAGNOSIS:  Torn medial meniscus, right knee.POSTOPERATIVE DIAGNOSIS:  Flap tear medial meniscus, right knee. PROCEDURE:  Arthroscopic evaluation with simple debridement of medial meniscus and debridement of incidental plica.    Interval: Patient reports click and swelling since Saturday.  Pain superior to kneecap.  Has been walking more.  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  Patient states he feels a grinding and pain below his kneecap.  Patient evaluation done with Dr. Brody    Physical Exam:  Vitals: BP (!) 158/106  Temp 99  F (37.2  C) (Oral)  Ht 1.854 m (6' 1\")  Wt 128.4 kg (283 lb)  BMI 37.34 kg/m2  BMI= Body mass index is 37.34 kg/(m^2).  Constitutional: healthy, alert and no acute distress   Psychiatric: mentation appears normal and affect normal/bright  NEURO: no focal deficits  Location of surgery: right knee  Incision sites: Well healed.  Patient with a mild amount of swelling.  There is no redness  Range of motion: Full extension and flexion  Gait with minimal antalgia  Patient right thigh tone is decreased in comparison to the left.    ASSESSMENT:    Chief Complaint   Patient presents with     RECHECK     Right knee arthroscopy with partial medial meniscus debridement / menisectomy.  DOS: 8/8/18 (5 weeks postop)     Surgical Followup     PREOPERATIVE DIAGNOSIS:  Torn medial meniscus, right knee.POSTOPERATIVE DIAGNOSIS:  Flap " tear medial meniscus, right knee. PROCEDURE:  Arthroscopic evaluation with simple debridement of medial meniscus and debridement of incidental plica.        ICD-10-CM    1. Patellofemoral pain syndrome of right knee M22.2X1 PHYSICAL THERAPY REFERRAL   2. S/P arthroscopic knee surgery Z98.890 PHYSICAL THERAPY REFERRAL     Patient is 5 weeks status post right knee medial meniscus debridement and plica removal.  Patient was doing well up until some swelling occurred this past Saturday and now he has been experiencing clicking.  Likely this is transient and related to weakness    Plan:   Patient is advised of the above.  He is advised that swelling and clicking can be transient.  Patient further alerted that he did not have patellofemoral symptoms prior to surgery and this is likely what is occurring.  We will schedule patient for physical therapy to address comprehensive patellofemoral program status post knee arthroscopy  Return to work: A letter is written for work to limit standing and walking to 4 hours.  Patient may do more sedentary activities with full workday.  Return to clinic 4 weeks for reevaluation    BP Readings from Last 1 Encounters:   09/11/18 (!) 158/106     Patient given smoking cessation education sheets    BP noted to be elevated today in office.  Patient to follow up with Primary Care provider regarding elevated blood pressure.    Marge Davidson PA-C   9/11/2018  8:24 AM      I attest I have seen and evaluated the patient.  I agree with above impression and plan.    Miguel Brody MD    Again, thank you for allowing me to participate in the care of your patient.        Sincerely,        Miguel Brody MD

## 2018-09-11 NOTE — NURSING NOTE
patient has blood pressure pills, has not started taking them yet.  I did inform him he should start them as soon as he can    Nusrat/MADELYN

## 2018-09-11 NOTE — PROGRESS NOTES
"HISTORY OF PRESENT ILLNESS:    Maco Chen is a 27 year old male who is seen in follow up for   Chief Complaint   Patient presents with     RECHECK     Right knee arthroscopy with partial medial meniscus debridement / menisectomy.  DOS: 8/8/18 (5 weeks postop)     Surgical Followup     PREOPERATIVE DIAGNOSIS:  Torn medial meniscus, right knee.POSTOPERATIVE DIAGNOSIS:  Flap tear medial meniscus, right knee. PROCEDURE:  Arthroscopic evaluation with simple debridement of medial meniscus and debridement of incidental plica.    Interval: Patient reports click and swelling since Saturday.  Pain superior to kneecap.  Has been walking more.  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  Patient states he feels a grinding and pain below his kneecap.  Patient evaluation done with Dr. Brody    Physical Exam:  Vitals: BP (!) 158/106  Temp 99  F (37.2  C) (Oral)  Ht 1.854 m (6' 1\")  Wt 128.4 kg (283 lb)  BMI 37.34 kg/m2  BMI= Body mass index is 37.34 kg/(m^2).  Constitutional: healthy, alert and no acute distress   Psychiatric: mentation appears normal and affect normal/bright  NEURO: no focal deficits  Location of surgery: right knee  Incision sites: Well healed.  Patient with a mild amount of swelling.  There is no redness  Range of motion: Full extension and flexion  Gait with minimal antalgia  Patient right thigh tone is decreased in comparison to the left.    ASSESSMENT:    Chief Complaint   Patient presents with     RECHECK     Right knee arthroscopy with partial medial meniscus debridement / menisectomy.  DOS: 8/8/18 (5 weeks postop)     Surgical Followup     PREOPERATIVE DIAGNOSIS:  Torn medial meniscus, right knee.POSTOPERATIVE DIAGNOSIS:  Flap tear medial meniscus, right knee. PROCEDURE:  Arthroscopic evaluation with simple debridement of medial meniscus and debridement of incidental plica.        ICD-10-CM    1. Patellofemoral pain syndrome of right knee M22.2X1 PHYSICAL THERAPY REFERRAL   2. S/P " arthroscopic knee surgery Z98.890 PHYSICAL THERAPY REFERRAL     Patient is 5 weeks status post right knee medial meniscus debridement and plica removal.  Patient was doing well up until some swelling occurred this past Saturday and now he has been experiencing clicking.  Likely this is transient and related to weakness    Plan:   Patient is advised of the above.  He is advised that swelling and clicking can be transient.  Patient further alerted that he did not have patellofemoral symptoms prior to surgery and this is likely what is occurring.  We will schedule patient for physical therapy to address comprehensive patellofemoral program status post knee arthroscopy  Return to work: A letter is written for work to limit standing and walking to 4 hours.  Patient may do more sedentary activities with full workday.  Return to clinic 4 weeks for reevaluation    BP Readings from Last 1 Encounters:   09/11/18 (!) 158/106     Patient given smoking cessation education sheets    BP noted to be elevated today in office.  Patient to follow up with Primary Care provider regarding elevated blood pressure.    Marge Davidson PA-C   9/11/2018  8:24 AM      I attest I have seen and evaluated the patient.  I agree with above impression and plan.    Miguel Brody MD

## 2018-09-11 NOTE — MR AVS SNAPSHOT
"              After Visit Summary   9/11/2018    Maco Chen    MRN: [id number]           Patient Information     Date Of Birth          1990        Visit Information        Provider Department      9/11/2018 7:30 AM Miguel Brody MD [facility]        Today's Diagnoses     Patellofemoral pain syndrome of right knee    -  1    S/P arthroscopic knee surgery           Follow-ups after your visit        Additional Services     PHYSICAL THERAPY REFERRAL       *This therapy referral will be filtered to a centralized scheduling office at [facility] and the patient will receive a call to schedule an appointment at a [place] location most convenient for them. *     [facility] provides Physical Therapy evaluation and treatment and many specialty services across the [place] system.  If requesting a specialty program, please choose from the list below.    If you have not heard from the scheduling office within 2 business days, please call [phone] for all locations, with the exception of [place], please call [phone] and [facility], please call [phone]  Treatment: Evaluation & Treatment  Special Instructions/Modalities: comprehensive patella femoral program  Special Programs: as above    Please be aware that coverage of these services is subject to the terms and limitations of your health insurance plan.  Call member services at your health plan with any benefit or coverage questions.      **Note to Provider:  If you are referring outside of [place] for the therapy appointment, please list the name of the location in the \"special instructions\" above, print the referral and give to the patient to schedule the appointment.                  Who to contact     If you have questions or need follow up information about today's clinic visit or your schedule please contact [facility] directly at [phone].  Normal or " "non-critical lab and imaging results will be communicated to you by MyChart, letter or phone within 4 business days after the clinic has received the results. If you do not hear from us within 7 days, please contact the clinic through MyChart or phone. If you have a critical or abnormal lab result, we will notify you by phone as soon as possible.  Submit refill requests through [employer] or call your pharmacy and they will forward the refill request to us. Please allow 3 business days for your refill to be completed.          Additional Information About Your Visit        Care EveryWhere ID     This is your Care EveryWhere ID. This could be used by other organizations to access your [facility] medical records  [id number]        Your Vitals Were     Temperature Height BMI (Body Mass Index)             99  F (37.2  C) (Oral) 1.854 m (6' 1\") 37.34 kg/m2          Blood Pressure from Last 3 Encounters:   09/11/18 (!) 158/106   09/04/18 138/84   08/21/18 153/86    Weight from Last 3 Encounters:   09/11/18 128.4 kg (283 lb)   09/04/18 128.4 kg (283 lb)   08/21/18 132.5 kg (292 lb)              We Performed the Following     PHYSICAL THERAPY REFERRAL        Primary Care Provider Fax #    Physician No Ref-Primary [phone]       No address on file        Equal Access to Services     RADHA JO : Hadii tyler boogie hadasho Soomaali, waaxda luqadaha, qaybta kaalmada adeegyada, alison moran . So [facility] [phone].    ATENCIÓN: Si habla español, tiene a tavares disposición servicios gratuitos de asistencia lingüística. Llame al [phone].    We comply with applicable federal civil rights laws and [place] laws. We do not discriminate on the basis of race, color, national origin, age, disability, sex, sexual orientation, or gender identity.            Thank you!     Thank you for choosing [facility]  for your care. Our goal is always to provide you with excellent care. Hearing back from our " patients is one way we can continue to improve our services. Please take a few minutes to complete the written survey that you may receive in the mail after your visit with us. Thank you!             Your Updated Medication List - Protect others around you: Learn how to safely use, store and throw away your medicines at www.disposemymeds.org.          This list is accurate as of 9/11/18  7:52 AM.  Always use your most recent med list.                   Brand Name Dispense Instructions for use Diagnosis    cefuroxime 500 MG tablet    CEFTIN    20 tablet    Take 1 tablet (500 mg) by mouth 2 times daily    Acute bronchitis with symptoms > 10 days       lisinopril 5 MG tablet    PRINIVIL/ZESTRIL    90 tablet    Take 1 tablet (5 mg) by mouth daily    Essential hypertension       zolpidem 10 MG tablet    AMBIEN    30 tablet    Take 1 tablet (10 mg) by mouth nightly as needed for sleep

## 2018-09-11 NOTE — LETTER
[address] [address]  [phone]          September 11, 2018    RE:  Maco Chen                                                                                                                                                       [address] [facility] [address]            To whom it may concern:    Maco Chen is under my professional care for    Patellofemoral pain syndrome of right knee  S/P arthroscopic knee surgery  Patient may return to work with restrictions of limit of standing and walking to 4 hours per day.  Patient still encountering post op swelling.       Sincerely,        Miguel Brody MD

## 2018-09-18 ENCOUNTER — HOSPITAL ENCOUNTER (OUTPATIENT)
Dept: PHYSICAL THERAPY | Facility: OTHER | Age: 28
Setting detail: THERAPIES SERIES
End: 2018-09-18
Attending: PHYSICIAN ASSISTANT
Payer: COMMERCIAL

## 2018-09-18 ENCOUNTER — TELEPHONE (OUTPATIENT)
Dept: FAMILY MEDICINE | Facility: OTHER | Age: 28
End: 2018-09-18

## 2018-09-18 PROCEDURE — 97161 PT EVAL LOW COMPLEX 20 MIN: CPT | Mod: GP | Performed by: PHYSICAL THERAPIST

## 2018-09-18 PROCEDURE — 97110 THERAPEUTIC EXERCISES: CPT | Mod: GP | Performed by: PHYSICAL THERAPIST

## 2018-09-18 PROCEDURE — 40000718 ZZHC STATISTIC PT DEPARTMENT ORTHO VISIT: Performed by: PHYSICAL THERAPIST

## 2018-09-18 NOTE — TELEPHONE ENCOUNTER
Panel Management Review      Patient has the following on his problem list:       Composite cancer screening  Chart review shows that this patient is due/due soon for the following   Summary:    Patient is due/failing the following:   Blood pressure check    Action needed:   Patient needs nurse only appointment.    Type of outreach:    Phone, left message for patient to call back.     Questions for provider review:    None                                                                                                                                    Venice BARRIENTOS LPN       Chart routed to Venice BARRIENTOS LPN

## 2018-09-18 NOTE — TELEPHONE ENCOUNTER
The patient called back and information has been given. He will call back to get this scheduled.   Thank you,  Annamaria Aden   for [facility]
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
## 2019-01-22 ENCOUNTER — OFFICE VISIT (OUTPATIENT)
Dept: URGENT CARE | Facility: RETAIL CLINIC | Age: 29
End: 2019-01-22

## 2019-01-22 ENCOUNTER — HOSPITAL ENCOUNTER (EMERGENCY)
Facility: CLINIC | Age: 29
Discharge: HOME | End: 2019-01-22
Attending: FAMILY MEDICINE | Admitting: FAMILY MEDICINE

## 2019-01-22 VITALS
RESPIRATION RATE: 20 BRPM | OXYGEN SATURATION: 99 % | WEIGHT: 291 LBS | HEART RATE: 108 BPM | TEMPERATURE: 99 F | SYSTOLIC BLOOD PRESSURE: 158 MMHG | DIASTOLIC BLOOD PRESSURE: 88 MMHG | BODY MASS INDEX: 38.39 KG/M2

## 2019-01-22 VITALS — SYSTOLIC BLOOD PRESSURE: 148 MMHG | TEMPERATURE: 98.7 F | DIASTOLIC BLOOD PRESSURE: 88 MMHG | HEART RATE: 94 BPM

## 2019-01-22 DIAGNOSIS — L50.9 URTICARIA: Primary | ICD-10-CM

## 2019-01-22 DIAGNOSIS — L50.9 URTICARIA: ICD-10-CM

## 2019-01-22 DIAGNOSIS — T78.40XA ALLERGIC REACTION, INITIAL ENCOUNTER: ICD-10-CM

## 2019-01-22 PROCEDURE — 25000125 ZZHC RX 250: Performed by: FAMILY MEDICINE

## 2019-01-22 PROCEDURE — 99284 EMERGENCY DEPT VISIT MOD MDM: CPT | Mod: Z6 | Performed by: FAMILY MEDICINE

## 2019-01-22 PROCEDURE — 99213 OFFICE O/P EST LOW 20 MIN: CPT | Performed by: INTERNAL MEDICINE

## 2019-01-22 PROCEDURE — 99283 EMERGENCY DEPT VISIT LOW MDM: CPT | Performed by: FAMILY MEDICINE

## 2019-01-22 RX ORDER — TRIAMCINOLONE ACETONIDE 1 MG/G
CREAM TOPICAL 2 TIMES DAILY
Qty: 45 G | Refills: 0 | Status: SHIPPED | OUTPATIENT
Start: 2019-01-22 | End: 2019-04-24

## 2019-01-22 RX ORDER — PREDNISONE 20 MG/1
60 TABLET ORAL ONCE
Status: COMPLETED | OUTPATIENT
Start: 2019-01-22 | End: 2019-01-22

## 2019-01-22 RX ORDER — HYDROXYZINE PAMOATE 25 MG/1
25 CAPSULE ORAL 4 TIMES DAILY PRN
Qty: 20 CAPSULE | Refills: 0 | Status: SHIPPED | OUTPATIENT
Start: 2019-01-22 | End: 2019-04-24

## 2019-01-22 RX ORDER — PREDNISONE 20 MG/1
20 TABLET ORAL 2 TIMES DAILY
Qty: 10 TABLET | Refills: 0 | Status: SHIPPED | OUTPATIENT
Start: 2019-01-22 | End: 2019-04-24

## 2019-01-22 RX ADMIN — PREDNISONE 60 MG: 20 TABLET ORAL at 20:44

## 2019-01-22 ASSESSMENT — ENCOUNTER SYMPTOMS
BACK PAIN: 0
CONFUSION: 0
POLYDIPSIA: 0
VOICE CHANGE: 0
SHORTNESS OF BREATH: 0
FLANK PAIN: 0
EYE REDNESS: 0
EYE PAIN: 0
LIGHT-HEADEDNESS: 0
CHEST TIGHTNESS: 0
DIZZINESS: 0
NERVOUS/ANXIOUS: 1
FEVER: 0
DIARRHEA: 0
WEAKNESS: 0
CHILLS: 0
NAUSEA: 0
RECTAL PAIN: 0
ABDOMINAL PAIN: 0
SORE THROAT: 0
TROUBLE SWALLOWING: 0

## 2019-01-22 NOTE — ED AVS SNAPSHOT
[facility]  [address] [place] [address]  Phone:  [phone]  Fax:  [phone]                                    Maco Chen   MRN: [id number]    Department:  [facility]   Date of Visit:  1/22/2019           After Visit Summary Signature Page    I have received my discharge instructions, and my questions have been answered. I have discussed any challenges I see with this plan with the nurse or doctor.    ..........................................................................................................................................  Patient/Patient Representative Signature      ..........................................................................................................................................  Patient Representative Print Name and Relationship to Patient    ..................................................               ................................................  Date                                   Time    ..........................................................................................................................................  Reviewed by Signature/Title    ...................................................              ..............................................  Date                                               Time          22EPIC Rev 08/18

## 2019-01-23 NOTE — ED PROVIDER NOTES
History     Chief Complaint   Patient presents with     Hives     HPI  Maco Chen is a 28 year old male who presents to the emergency room with hives.  He was seen in urgent care earlier and given some cortisone cream.  He is hives on his chest arms and back.  He does not know what he is reacting to.  He is on no medications orally.  He has had hives in the past.  He has no history of asthma and denies any chest pain or wheezing.  No throat swelling or difficulty swallowing.  No wheezing or voice hoarseness.  No history of anaphylaxis.    Allergies:  Allergies   Allergen Reactions     Tessalon [Benzonatate] Itching, Swelling and Rash     Red flushed face, hives to face, ears and jaw area very swollen       Zithromax [Azithromycin Dihydrate] Itching, Swelling and Rash     Red flushed face, hives to face, ears and jaw area very swollen       Problem List:    Patient Active Problem List    Diagnosis Date Noted     Knee Pain, effusion, right, trauma 04/14/2010     Priority: Medium     Tobacco use disorder 12/13/2007     Priority: Medium     Oppositional defiant disorder      Priority: Medium     Problem list name updated by automated process. Provider to review       Disturbance in sleep behavior 03/15/2006     Priority: Medium     Problem list name updated by automated process. Provider to review       Hypertrophy of tonsils alone 03/15/2006     Priority: Medium     Morbid obesity with body mass index of 40.0-44.9 in adult (H) 02/27/2003     Priority: Medium     Problem list name updated by automated process. Provider to review          Past Medical History:    Past Medical History:   Diagnosis Date     Hypertrophy of tonsils alone      Motion sickness      Obesity, unspecified      Oppositional defiant disorder of childhood or adolescence      Sleep disturbance, unspecified        Past Surgical History:    Past Surgical History:   Procedure Laterality Date     ARTHROSCOPY KNEE WITH MEDIAL MENISCECTOMY  Right 8/8/2018    Procedure: ARTHROSCOPY KNEE WITH MEDIAL MENISCECTOMY;  right knee arthroscopy with partial medial meniscus debridement / menisectomy;  Surgeon: Miguel Brody MD;  Location: [facility]     HC REMOVAL PREPATELLA BURSA  05/27/10     TONSILLECTOMY & ADENOIDECTOMY  3/21/2006       Family History:    Family History   Problem Relation Age of Onset     Cancer Maternal Grandfather         throat cancer     Allergies Brother      Allergies Sister        Social History:  Marital Status:   [2]  Social History     Tobacco Use     Smoking status: Current Every Day Smoker     Packs/day: 1.00     Years: 5.00     Pack years: 5.00     Types: Cigarettes     Smokeless tobacco: Never Used   Substance Use Topics     Alcohol use: Yes     Alcohol/week: 0.0 oz     Comment: 2 x per year     Drug use: No        Medications:      hydrOXYzine (VISTARIL) 25 MG capsule   predniSONE (DELTASONE) 20 MG tablet   triamcinolone (KENALOG) 0.1 % external cream         Review of Systems   Constitutional: Negative for chills and fever.   HENT: Negative for congestion, sore throat, trouble swallowing and voice change.    Eyes: Negative for pain and redness.   Respiratory: Negative for chest tightness and shortness of breath.    Cardiovascular: Negative for chest pain.   Gastrointestinal: Negative for abdominal pain, diarrhea, nausea and rectal pain.   Endocrine: Negative for polydipsia and polyuria.   Genitourinary: Negative for flank pain.   Musculoskeletal: Negative for back pain.   Skin: Positive for rash.   Allergic/Immunologic: Negative for environmental allergies, food allergies and immunocompromised state.   Neurological: Negative for dizziness, weakness and light-headedness.   Psychiatric/Behavioral: Negative for confusion. The patient is nervous/anxious.         Patient states he has been under a lot of stress lately.   All other systems reviewed and are negative.      Physical Exam   BP: (!) 162/92  Pulse: 108  Heart  Rate: 88  Temp: 99  F (37.2  C)  Resp: 20  Weight: 132 kg (291 lb)  SpO2: 96 %      Physical Exam   Constitutional: He is oriented to person, place, and time. He appears well-developed.   Alert pleasant smiling nontoxic obese male who appears in no distress.  He is afebrile and his vital signs are stable./88   Pulse 108   Temp 99  F (37.2  C) (Oral)   Resp 20   Wt 132 kg (291 lb)   SpO2 99%   BMI 38.39 kg/m       HENT:   Head: Normocephalic and atraumatic.   Right Ear: External ear normal.   Left Ear: External ear normal.   Mouth/Throat: Oropharynx is clear and moist.   Eyes: Conjunctivae are normal. Pupils are equal, round, and reactive to light.   Neck: Normal range of motion.   Cardiovascular: Normal rate, regular rhythm, normal heart sounds and intact distal pulses.   Pulmonary/Chest: Effort normal and breath sounds normal. He has no wheezes.   Abdominal: Soft.   Musculoskeletal: Normal range of motion.   Neurological: He is alert and oriented to person, place, and time.   Skin: Skin is warm and dry. Rash noted. Rash is urticarial.        Patient is a very typical appearing urticarial rash over both arms upper chest and upper back.  It is worse in areas where he can scratch and he is actively scratching at it.  He also has dermatographia where he is scratching.   Nursing note and vitals reviewed.      ED Course        Procedures               Critical Care time:  none               No results found for this or any previous visit (from the past 24 hour(s)).    Medications   predniSONE (DELTASONE) tablet 60 mg (60 mg Oral Given 1/22/19 2044)       Assessments & Plan (with Medical Decision Making)   MDM--28-year-old who presents with a 2-3-day history of hives.  He has a history of hives in the past.  He does not know what he could be reacting to.  He is having no respiratory symptoms of wheezing.  He has no history of asthma.  I recommend we treat this with prednisone which she has taken in the past  with good benefit.  We also discussed stress as a possible cause of this and he is having a lot of stress related to divorce.  The patient also has severe pruritus because of the hives and was constantly scratching at himself.  I recommended stopping this.  He has tried Benadryl without much success.  I also gave him a prescription for hydroxyzine 25 mg 4 times a day to see if that will help avoid scratching and the itching.  It may also have some anxiolytic effect for him which would also be beneficial.  Patient is comfortable with this evaluation and discharge plan he is discharged in improved condition.  Patient was given prednisone 60 mg oral in the ED.      I have reviewed the nursing notes.    I have reviewed the findings, diagnosis, plan and need for follow up with the patient.             Medication List      Started    hydrOXYzine 25 MG capsule  Commonly known as:  VISTARIL  25 mg, Oral, 4 TIMES DAILY PRN     predniSONE 20 MG tablet  Commonly known as:  DELTASONE  20 mg, Oral, 2 TIMES DAILY            Final diagnoses:   Urticaria   Allergic reaction, initial encounter       1/22/2019   [facility] EMERGENCY DEPARTMENT     Zara, David PACE MD  01/22/19 [id number]

## 2019-01-23 NOTE — PROGRESS NOTES
[facility] Care Progress Note        Stephanie Jenkins MD, MPH  01/22/2019    Maco Chen is a pleasant  28 year old male seen for a pruritic rash involving trunk for 3 days . There has not been any change in the diet or new detergent, soap or toiletries.  No new medications, no insect bite. No fever or chills and no recent illness. No cough or shortness of breath or wheezing is referred. No contact w anyone w similar symptoms. No nausea, vomiting or diarrhea.  No arthralgia or myalgia.  No tongue, lip or mouth swelling or swallowing problems are referred.    Physical Exam   /88   Pulse 94   Temp 98.7  F (37.1  C) (Oral)      Constitutional: Patient is in no distress The patient is pleasant and cooperative.   HEENT: Head:  Head is atraumatic, normocephalic.    Eyes: Pupils are equal, round and reactive to light and accomodation.  Sclera is non-icteric. No conjunctival injection, or exudate noted. Extraocular motion is intact. Visual acuity is intact bilaterally.  Ears:  External acoustic canals are patent and clear.  There is no erythema and bulging( exudate)  of the ( R/L ) tympanic membrane(s ).   Nose:  No Nasal congestion w/o drainage or mucosal ulceration is noted.  Throat:  Oral mucosa is moist.  No oral lesions are noted.  No posterior pharyngeal hyperemia or exudate noted.     Neck Supple.  There is no cervical lymphadenopathy.  No nuchal rigidity noted.  There is no meningismus.     Cardiovascular: Heart is regular to rate and rhythm.  No murmur is noted.     Chest. Chest Symmetrical, no soft tissues, swelling, or tenderness upon palpation   Lungs: Clear in the anterior and posterior pulmonary fields.   Abdomen: Soft and non-tender.    Back No flank tenderness is noted.   Extremeties No edema, no calf tenderness.   Neuro: No focal deficit.   Skin No petechiae or purpura is noted.  There are focal areas of urticaria involving lower cervical crease ( L>R) as well as mid to  lower abdomen. No involvement of hands or forearms or distal lower extremities. No current facial involvement.   Not pustules or vesicles or dermatomal pattern noted.  No crusty or exudate skin lesions. No ulcerations.     Mood Normal         Assessment & Plan     Urticaria:  - triamcinolone (KENALOG) 0.1 % external cream; Apply topically 2 times daily Apply topically and sparingly twice daily x 1 week.  Dispense: 45 g; Refill: 0     Follow-up with your PCP in 4-5 days, earlier if symptoms worsen..  Advised to use plenty of moisturizing cream.  Advised to avoid hot baths/showers.  Advised to avoid irritating soap/detergents.  Avoid warm environments.  Seek medical attention immediately by calling 911 or go to ER if there is any respiratory stress, chest pain or other concerns.  Use Benadryl 25 mg po every 8 hours as needed for pruritis ( discussed the sedative nature of benadryl and advised patient to avoid operating equipment/machinery and caution with driving is advised ).  Please wash the skin with soap and cool water daily.

## 2019-04-22 ENCOUNTER — OFFICE VISIT (OUTPATIENT)
Dept: FAMILY MEDICINE | Facility: OTHER | Age: 29
End: 2019-04-22

## 2019-04-22 VITALS
DIASTOLIC BLOOD PRESSURE: 82 MMHG | WEIGHT: 288.2 LBS | HEART RATE: 92 BPM | TEMPERATURE: 97.9 F | BODY MASS INDEX: 39.04 KG/M2 | RESPIRATION RATE: 16 BRPM | OXYGEN SATURATION: 99 % | SYSTOLIC BLOOD PRESSURE: 124 MMHG | HEIGHT: 72 IN

## 2019-04-22 DIAGNOSIS — L03.90 CELLULITIS, UNSPECIFIED CELLULITIS SITE: Primary | ICD-10-CM

## 2019-04-22 PROCEDURE — 87186 SC STD MICRODIL/AGAR DIL: CPT | Performed by: PHYSICIAN ASSISTANT

## 2019-04-22 PROCEDURE — 87077 CULTURE AEROBIC IDENTIFY: CPT | Performed by: PHYSICIAN ASSISTANT

## 2019-04-22 PROCEDURE — 99214 OFFICE O/P EST MOD 30 MIN: CPT | Performed by: PHYSICIAN ASSISTANT

## 2019-04-22 PROCEDURE — 87070 CULTURE OTHR SPECIMN AEROBIC: CPT | Performed by: PHYSICIAN ASSISTANT

## 2019-04-22 RX ORDER — DOXYCYCLINE 100 MG/1
100 TABLET ORAL 2 TIMES DAILY
Qty: 20 TABLET | Refills: 0 | Status: SHIPPED | OUTPATIENT
Start: 2019-04-22 | End: 2019-08-22

## 2019-04-22 ASSESSMENT — MIFFLIN-ST. JEOR: SCORE: 2332.27

## 2019-04-22 NOTE — PROGRESS NOTES
SUBJECTIVE:   Maco Chen is a 28 year old male who presents to clinic today for the following health issues:    History of Present Illness     Diet:  Regular (no restrictions)  Frequency of exercise:  None  Taking medications regularly:  Yes  Medication side effects:  None  Additional concerns today:  No    Concern - infected cuts on the right arm, a couple on the left hand.   Onset: about a month    Description:   Red, itchy, scabby, swollen right forearm to right armpit, painful when touched, drainage/pus nonstop    Intensity: 6-7/10    Progression of Symptoms:  worsening    Accompanying Signs & Symptoms:  none    Previous history of similar problem:    none    Precipitating factors:   Worsened by: touching them, bandaids    Alleviating factors:  Improved by: neosporin, ibuprofen    Therapies Tried and outcome: see above    Has been going on for about a month.  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  He says they seem to scab up and now are getting red and sometimes drain pus.  He has noticed his right upper arm has felt more warm and swollen and tender to the touch.      Denies fevers, chills, chest pain, nausea, vomiting.   He has been applying antibiotic ointment without resolution.   He did have his tattoo worked on about 2 months ago and had no problems at that time.   Has had cellulitis in the past.     Additional history: as documented    Reviewed and updated as needed this visit by clinical staff  Tobacco  Allergies  Meds  Med Hx  Surg Hx  Fam Hx  Soc Hx        Reviewed and updated as needed this visit by Provider         Current Outpatient Medications   Medication Sig Dispense Refill     doxycycline monohydrate (ADOXA) 100 MG tablet Take 1 tablet (100 mg) by mouth 2 times daily for 10 days 20 tablet 0     triamcinolone (KENALOG) 0.1 % external cream Apply topically 2 times daily Apply topically and sparingly twice daily x 1 week. (Patient not taking:  "Reported on 4/22/2019) 45 g 0       ROS:  Constitutional, HEENT, cardiovascular, pulmonary, GI, , musculoskeletal, neuro, skin systems are negative, except as otherwise noted.    OBJECTIVE:     /82   Pulse 92   Temp 97.9  F (36.6  C) (Temporal)   Resp 16   Ht 1.84 m (6' 0.44\")   Wt 130.7 kg (288 lb 3.2 oz)   SpO2 99%   BMI 38.61 kg/m    Body mass index is 38.61 kg/m .  GENERAL: healthy, alert and no distress  MS: normal muscle tone, normal range of motion and mildly tender to palpation over the right upper extremity without significant edema noted.  Tenderness to palpation over the right axillary lymph nodes with mild lymphadenopathy.     SKIN: Right upper extremity there are 5 scabs that range in size from a dime to a nickel, they are surrounded by erythema and are warm to the touch without drainage noted and no areas of fluctuance.  The upper right extremity is warm to the touch without signs of streaking.  Left pointer finger there is a scab.  Left forehead small scab without erythema.    NEURO: Normal strength and tone, mentation intact and speech normal  PSYCH: mentation appears normal, affect normal/bright    Diagnostic Test Results:  none     ASSESSMENT/PLAN:         ICD-10-CM    1. Cellulitis, unspecified cellulitis site L03.90 doxycycline monohydrate (ADOXA) 100 MG tablet     Wound Culture Aerobic Bacterial       Concern for cellulitis at the sites of the wounds.  There are no signs of abscess formation and no active draining lesions.  Did obtain a wound culture, concern for MRSA given these wounds are not healing properly.  Did place patient on Doxycycline (bactrim has concerns for allergic reaction given allergy to benzonatate).  Recommended keeping the areas clean and dry, cover if needed.  No signs of streaking up the arm at this time and he has normal range of motion.  Discussed if any signs of worsening erythema, increased warmth, swelling, fevers, chills, acutely ill recommended ER " evaluation immediately.  Will have patient follow-up in 2 days for re-check, sooner if any concerns.  We will call with WC results when available.         Options for treatment and follow-up care were reviewed with the patient and/or guardian. Patient and/or guardian engaged in the decision making process and verbalized understanding of the options discussed and agreed with the final plan.     Tab Sanchez PA-C  [facility]

## 2019-04-22 NOTE — PROGRESS NOTES
SUBJECTIVE:   Maco Chen is a 28 year old male who presents to clinic today for the following health issues:      HPI    - Noticed after starting the Doxycycline that his right upper arm is not as painful or red. Still feels like his veins are prominent in the upper arm but no pain.   - No change in the redness around the scabs.  They are still draining.   - No new lesions.   - Denies fevers, chills.   - No side effects on antibiotics including diarrhea, nausea, vomiting, rash.     Plan from last OV 04/22/2019:  Concern for cellulitis at the sites of the wounds.  There are no signs of abscess formation and no active draining lesions.  Did obtain a wound culture, concern for MRSA given these wounds are not healing properly.  Did place patient on Doxycycline (bactrim has concerns for allergic reaction given allergy to benzonatate).  Recommended keeping the areas clean and dry, cover if needed.  No signs of streaking up the arm at this time and he has normal range of motion.  Discussed if any signs of worsening erythema, increased warmth, swelling, fevers, chills, acutely ill recommended ER evaluation immediately.  Will have patient follow-up in 2 days for re-check, sooner if any concerns.  We will call with WC results when available.         Additional history: as documented    Reviewed and updated as needed this visit by clinical staff  Tobacco  Allergies  Meds  Med Hx  Surg Hx  Fam Hx  Soc Hx        Reviewed and updated as needed this visit by Provider         Current Outpatient Medications   Medication Sig Dispense Refill     clindamycin (CLEOCIN) 300 MG capsule Take 1 capsule (300 mg) by mouth 3 times daily for 10 days 30 capsule 0     doxycycline monohydrate (ADOXA) 100 MG tablet Take 1 tablet (100 mg) by mouth 2 times daily for 10 days 20 tablet 0     Allergies   Allergen Reactions     Tessalon [Benzonatate] Itching, Swelling and Rash     Red flushed face, hives to face, ears and jaw area  very swollen       Zithromax [Azithromycin Dihydrate] Itching, Swelling and Rash     Red flushed face, hives to face, ears and jaw area very swollen       ROS:  Constitutional, GI, , musculoskeletal, neuro, skin systems are negative, except as otherwise noted.    OBJECTIVE:     /90   Pulse 100   Temp 97.2  F (36.2  C) (Temporal)   Resp 14   Wt 130.2 kg (287 lb)   SpO2 97%   BMI 38.45 kg/m    Body mass index is 38.45 kg/m .  GENERAL: healthy, alert and no distress  MS: no gross musculoskeletal defects noted, no edema  SKIN: Right upper extremity there are 5 scabs that range in size from a dime to a nickel, they are surrounded by erythema without excessive warmth or drainage noted and no areas of fluctuance.  The upper right extremity is no longer warm to the touch and without signs of streaking.  Left pointer finger there is a scab.    NEURO: Normal strength and tone, mentation intact and speech normal  PSYCH: mentation appears normal, affect normal/bright    Diagnostic Test Results:  none     ASSESSMENT/PLAN:       ICD-10-CM    1. Cellulitis, unspecified cellulitis site L03.90 clindamycin (CLEOCIN) 300 MG capsule       We discussed his culture results which showed positive for both moderate growth of group A strep and light growth of staph.  Because of the continuous drainage and sores recommended we switch him to Clindamycin, discussed potential risks and side effects of the medication.  For now stop the Doxycycline.  We are still waiting on susceptibility testing and will contact with results when available and adjust antibiotics appropriately.  He will contact us if new symptoms are developing or he is getting worse in the meantime. Discussed the scabbing will take time to fully resolve but they should not be warm, red or tender to the touch or draining.     Follow-up pending lab results and symptoms.     Options for treatment and follow-up care were reviewed with the patient and/or guardian.  Patient and/or guardian engaged in the decision making process and verbalized understanding of the options discussed and agreed with the final plan.     Tab Sanchez PA-C  [facility]

## 2019-04-24 ENCOUNTER — OFFICE VISIT (OUTPATIENT)
Dept: FAMILY MEDICINE | Facility: OTHER | Age: 29
End: 2019-04-24

## 2019-04-24 VITALS
OXYGEN SATURATION: 97 % | BODY MASS INDEX: 38.45 KG/M2 | WEIGHT: 287 LBS | HEART RATE: 100 BPM | TEMPERATURE: 97.2 F | DIASTOLIC BLOOD PRESSURE: 90 MMHG | SYSTOLIC BLOOD PRESSURE: 140 MMHG | RESPIRATION RATE: 14 BRPM

## 2019-04-24 DIAGNOSIS — L03.90 CELLULITIS, UNSPECIFIED CELLULITIS SITE: Primary | ICD-10-CM

## 2019-04-24 LAB
BACTERIA SPEC CULT: ABNORMAL
BACTERIA SPEC CULT: ABNORMAL
Lab: ABNORMAL
SPECIMEN SOURCE: ABNORMAL

## 2019-04-24 PROCEDURE — 99212 OFFICE O/P EST SF 10 MIN: CPT | Performed by: PHYSICIAN ASSISTANT

## 2019-04-24 RX ORDER — CLINDAMYCIN HCL 300 MG
300 CAPSULE ORAL 3 TIMES DAILY
Qty: 30 CAPSULE | Refills: 0 | Status: SHIPPED | OUTPATIENT
Start: 2019-04-24 | End: 2019-08-22

## 2019-05-16 ENCOUNTER — TELEPHONE (OUTPATIENT)
Dept: FAMILY MEDICINE | Facility: CLINIC | Age: 29
End: 2019-05-16

## 2019-05-16 NOTE — TELEPHONE ENCOUNTER
Attempted outreach to patient: Left message to call back    Patient is due for:  Recheck bp    If patient had this completed elsewhere, please obtain approximate date, clinic/hospital where test was done and the result (abnormal/normal).    Please assist in scheduling if not completed.      Panel Management Review      Patient has the following on his problem list: None      Composite cancer screening  Chart review shows that this patient is due/due soon for the following None  Summary:    Patient is due/failing the following:   BP CHECK    Action needed:   Patient needs office visit for recheck bp.    Type of outreach:    Phone, left message for patient to call back.     Questions for provider review:    None                                                                                                                                    [facility]/MA       Chart routed to  .

## 2019-08-21 NOTE — PROGRESS NOTES
"Subjective     Maco Chen is a 28 year old male who presents to clinic today for the following health issues:    History of Present Illness        He eats 0-1 servings of fruits and vegetables daily.He consumes 5 sweetened beverage(s) daily.  He is taking medications regularly.     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 Pt had woke up normally and started experiencing the pain while he was driving, the pain was severe enough that he had to pull over and wait it out. He has been using Ibuprofen to help with pain relief, he mentions that it was very swollen yesterday and swelling has gone down.   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 He mentions that he lifted and threw a 200lbs equipment on Monday 08/19/19 \"out of frustration\", his pain started on Tuesday 08/20/19 morning.     Pt denies drinking any energy drinks before his visit today, he is slightly tachycardic today.     Patient Active Problem List   Diagnosis     Morbid obesity with body mass index of 40.0-44.9 in adult (H)     Disturbance in sleep behavior     Hypertrophy of tonsils alone     Oppositional defiant disorder     Knee Pain, effusion, right, trauma     Class 2 obesity due to excess calories with body mass index (BMI) of 38.0 to 38.9 in adult, unspecified whether serious comorbidity present     Past Surgical History:   Procedure Laterality Date     " ARTHROSCOPY KNEE WITH MEDIAL MENISCECTOMY Right 8/8/2018    Procedure: ARTHROSCOPY KNEE WITH MEDIAL MENISCECTOMY;  right knee arthroscopy with partial medial meniscus debridement / menisectomy;  Surgeon: Miguel Brody MD;  Location: [facility]     HC REMOVAL PREPATELLA BURSA  05/27/10     TONSILLECTOMY & ADENOIDECTOMY  3/21/2006       Social History     Tobacco Use     Smoking status: Current Every Day Smoker     Packs/day: 1.00     Years: 5.00     Pack years: 5.00     Types: Cigarettes     Smokeless tobacco: Never Used   Substance Use Topics     Alcohol use: Yes     Alcohol/week: 0.0 oz     Comment: 2 x per year     Family History   Problem Relation Age of Onset     Cancer Maternal Grandfather         throat cancer     Allergies Brother      Allergies Sister          No current outpatient medications on file.     Allergies   Allergen Reactions     Tessalon [Benzonatate] Itching, Swelling and Rash     Red flushed face, hives to face, ears and jaw area very swollen       Zithromax [Azithromycin Dihydrate] Itching, Swelling and Rash     Red flushed face, hives to face, ears and jaw area very swollen     Recent Labs   Lab Test 07/19/18  0933 03/16/15  1645   A1C 4.9  --    ALT  --  37   CR 0.91 0.89   GFRESTIMATED >90 >90  Non African American GFR Calc     GFRESTBLACK >90 >90  African American GFR Calc     POTASSIUM 5.4* 3.6      BP Readings from Last 3 Encounters:   08/22/19 130/84   04/24/19 140/90   04/22/19 124/82    Wt Readings from Last 3 Encounters:   08/22/19 128.5 kg (283 lb 6.4 oz)   04/24/19 130.2 kg (287 lb)   04/22/19 130.7 kg (288 lb 3.2 oz)         Reviewed and updated as needed this visit by Provider       Review of Systems   ROS COMP: Constitutional, HEENT, cardiovascular, pulmonary, GI, , musculoskeletal, neuro, skin, endocrine and psych systems are negative, except as otherwise noted.    This document serves as a record of the services and decisions personally performed and made by Yuliet Correia  CNP. It was created on his/her behalf by Manpreet Moss, trained medical scribe. The creation of this document is based the provider's statements to the medical scribes.    Farzad Moss 9:07 AM, August 22, 2019      Objective    /84   Pulse 110   Temp 100  F (37.8  C) (Temporal)   Resp 20   Ht 1.829 m (6')   Wt 128.5 kg (283 lb 6.4 oz)   SpO2 98%   BMI 38.44 kg/m    Body mass index is 38.44 kg/m .  Physical Exam   GENERAL: healthy, alert and no distress  HENT: ear canals and TM's normal, nose and mouth without ulcers or lesions  NECK: no adenopathy, no asymmetry, masses, or scars and thyroid normal to palpation  RESP: lungs clear to auscultation - no rales, rhonchi or wheezes  CV: tachycardic, regular rate and rhythm, normal S1 S2, no S3 or S4, no murmur, click or rub, no peripheral edema and peripheral pulses strong  MS: right elbow: no epicondyle tenderness, slight decrease in strength, full range of motion, no other gross musculoskeletal defects noted, no edema  NEURO: Normal strength and tone, mentation intact and speech normal  PSYCH: mentation appears normal, affect appears a bit anxious    Diagnostic Test Results:  Labs reviewed in Epic        Assessment & Plan       ICD-10-CM    1. Arm injury, right, initial encounter S49.91XA CANCELED: XR Humerus Right G/E 2 Views     CANCELED: XR Elbow Right 2 Views   2. Class 2 obesity due to excess calories with body mass index (BMI) of 38.0 to 38.9 in adult, unspecified whether serious comorbidity present E66.09     Z68.38    3. Tobacco use disorder F17.200      Discussed tobacco use, obesity, right arm injury and healthy eating habits with pt today.     Arm injury, right initial encounter- likely a muscle strain, advised to wrap right elbow with Ace wrap provided in clinic today. Letter suggesting a lifting/carrying/pushing restriction to 10lbs provided for pt today, strongly advised to have workplace abide by this limitation to  prevent further injury. Recommended to use ice and Ibuprofen to help with pain management. Advised to call/return if symptoms fail to improve or persist, will refer to PT, he is agreeable.     Class 2 obesity- advised to continue working on weight loss with increased exercise after recovery and healthy eating habits.     Advised to start attempting tobacco use, he shares that it has improved. Would like him to eliminate completely.    Had a cigarette prior to exam, no symptoms related to HR. Discussed healthy eating/weight loss.     Pt declines STD and HIV screenings at this visit.     Recommended a flu shot in the fall.    PHQ-9 and MALLORY questionnaire updated at this visit. Pt states his mood has been good and stable.      Tobacco Cessation:   reports that he has been smoking cigarettes.  He has a 5.00 pack-year smoking history. He has never used smokeless tobacco.  Tobacco Cessation Action Plan: Information offered: Patient not interested at this time    BMI:   Estimated body mass index is 38.44 kg/m  as calculated from the following:    Height as of this encounter: 1.829 m (6').    Weight as of this encounter: 128.5 kg (283 lb 6.4 oz).   Weight management plan: Discussed healthy diet and exercise guidelines    MEDICATIONS:  Continue current medications without change    Return in about 3 months (around 11/22/2019) for Routine Visit.     The information in this document, created by the medical scribe for me, accurately reflects the services I personally performed and the decisions made by me. I have reviewed and approved this document for accuracy prior to leaving the patient care area.  Yuliet Correia CNP  9:55 AM, August 22, 2019    GAVIN Whitlock CNP  [facility]

## 2019-08-22 ENCOUNTER — OFFICE VISIT (OUTPATIENT)
Dept: FAMILY MEDICINE | Facility: CLINIC | Age: 29
End: 2019-08-22
Payer: COMMERCIAL

## 2019-08-22 VITALS
SYSTOLIC BLOOD PRESSURE: 130 MMHG | WEIGHT: 283.4 LBS | DIASTOLIC BLOOD PRESSURE: 84 MMHG | RESPIRATION RATE: 20 BRPM | TEMPERATURE: 100 F | BODY MASS INDEX: 38.39 KG/M2 | HEART RATE: 110 BPM | HEIGHT: 72 IN | OXYGEN SATURATION: 98 %

## 2019-08-22 DIAGNOSIS — E66.09 CLASS 2 OBESITY DUE TO EXCESS CALORIES WITH BODY MASS INDEX (BMI) OF 38.0 TO 38.9 IN ADULT, UNSPECIFIED WHETHER SERIOUS COMORBIDITY PRESENT: ICD-10-CM

## 2019-08-22 DIAGNOSIS — F17.200 TOBACCO USE DISORDER: ICD-10-CM

## 2019-08-22 DIAGNOSIS — S49.91XA ARM INJURY, RIGHT, INITIAL ENCOUNTER: Primary | ICD-10-CM

## 2019-08-22 DIAGNOSIS — E66.812 CLASS 2 OBESITY DUE TO EXCESS CALORIES WITH BODY MASS INDEX (BMI) OF 38.0 TO 38.9 IN ADULT, UNSPECIFIED WHETHER SERIOUS COMORBIDITY PRESENT: ICD-10-CM

## 2019-08-22 PROCEDURE — 99213 OFFICE O/P EST LOW 20 MIN: CPT | Performed by: NURSE PRACTITIONER

## 2019-08-22 ASSESSMENT — ANXIETY QUESTIONNAIRES
6. BECOMING EASILY ANNOYED OR IRRITABLE: NOT AT ALL
2. NOT BEING ABLE TO STOP OR CONTROL WORRYING: NOT AT ALL
3. WORRYING TOO MUCH ABOUT DIFFERENT THINGS: NOT AT ALL
IF YOU CHECKED OFF ANY PROBLEMS ON THIS QUESTIONNAIRE, HOW DIFFICULT HAVE THESE PROBLEMS MADE IT FOR YOU TO DO YOUR WORK, TAKE CARE OF THINGS AT HOME, OR GET ALONG WITH OTHER PEOPLE: NOT DIFFICULT AT ALL
5. BEING SO RESTLESS THAT IT IS HARD TO SIT STILL: NOT AT ALL
7. FEELING AFRAID AS IF SOMETHING AWFUL MIGHT HAPPEN: NOT AT ALL
1. FEELING NERVOUS, ANXIOUS, OR ON EDGE: NOT AT ALL
GAD7 TOTAL SCORE: 0

## 2019-08-22 ASSESSMENT — PATIENT HEALTH QUESTIONNAIRE - PHQ9
SUM OF ALL RESPONSES TO PHQ QUESTIONS 1-9: 0
5. POOR APPETITE OR OVEREATING: NOT AT ALL

## 2019-08-22 ASSESSMENT — MIFFLIN-ST. JEOR: SCORE: 2303.49

## 2019-08-22 ASSESSMENT — PAIN SCALES - GENERAL: PAINLEVEL: SEVERE PAIN (6)

## 2019-08-22 NOTE — LETTER
August 22, 2019      Maco [place]  [address] [facility] [address]        To Whom It May Concern,      Maco was seen today for a right arm strain. He should avoid repetitive movement, no shoveling, or lifting over 10 Lbs (and over 5 LBS should be supported) for the next two weeks.           Sincerely,        GAVIN Whitlock CNP

## 2019-08-23 ASSESSMENT — ANXIETY QUESTIONNAIRES: GAD7 TOTAL SCORE: 0

## 2020-10-22 ENCOUNTER — HOSPITAL ENCOUNTER (EMERGENCY)
Facility: CLINIC | Age: 30
Discharge: HOME | End: 2020-10-22
Attending: EMERGENCY MEDICINE | Admitting: EMERGENCY MEDICINE

## 2020-10-22 ENCOUNTER — APPOINTMENT (OUTPATIENT)
Dept: CT IMAGING | Facility: CLINIC | Age: 30
End: 2020-10-22
Attending: EMERGENCY MEDICINE

## 2020-10-22 VITALS
BODY MASS INDEX: 33.91 KG/M2 | SYSTOLIC BLOOD PRESSURE: 195 MMHG | TEMPERATURE: 96.2 F | RESPIRATION RATE: 16 BRPM | OXYGEN SATURATION: 97 % | HEART RATE: 112 BPM | DIASTOLIC BLOOD PRESSURE: 89 MMHG | WEIGHT: 250 LBS

## 2020-10-22 DIAGNOSIS — S00.81XA ABRASION OF FACE, INITIAL ENCOUNTER: ICD-10-CM

## 2020-10-22 DIAGNOSIS — S00.83XA FACIAL CONTUSION, INITIAL ENCOUNTER: ICD-10-CM
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
## 2020-10-22 PROCEDURE — 99282 EMERGENCY DEPT VISIT SF MDM: CPT | Performed by: EMERGENCY MEDICINE

## 2020-10-22 PROCEDURE — 72125 CT NECK SPINE W/O DYE: CPT

## 2020-10-22 PROCEDURE — 70450 CT HEAD/BRAIN W/O DYE: CPT

## 2020-10-22 PROCEDURE — 99284 EMERGENCY DEPT VISIT MOD MDM: CPT | Mod: 25 | Performed by: EMERGENCY MEDICINE
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
## 2020-10-22 NOTE — DISCHARGE INSTRUCTIONS
Rest, ice or heat to the painful areas.  I recommend using ice over the facial swelling to decrease some of the inflammation.    You can use ibuprofen or Aleve for pain and inflammation.  Okay to add Tylenol as needed.    Follow-up in clinic for further concerns or return for worsening.    I hope you heal quickly!
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
## 2020-10-23 NOTE — ED NOTES
Pt threatening to leave no matter that he does not have a ride. We have spent 40 minutes trying to talk him into needing a ride, we have tried to find him a ride. The deputy has offered to give him a ride. He has declined all of the above and he is going to walk out no matter what. Dr Fernandez finally said to just let him walk. Pt is alert and oriented, steady on his feet.

## 2020-10-28 ENCOUNTER — OFFICE VISIT (OUTPATIENT)
Dept: FAMILY MEDICINE | Facility: OTHER | Age: 30
End: 2020-10-28

## 2020-10-28 VITALS
SYSTOLIC BLOOD PRESSURE: 150 MMHG | OXYGEN SATURATION: 98 % | DIASTOLIC BLOOD PRESSURE: 80 MMHG | HEIGHT: 73 IN | HEART RATE: 106 BPM | RESPIRATION RATE: 18 BRPM | BODY MASS INDEX: 34.52 KG/M2 | TEMPERATURE: 99 F | WEIGHT: 260.5 LBS
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
## 2020-10-28 DIAGNOSIS — S06.0X1D CONCUSSION WITH LOSS OF CONSCIOUSNESS OF 30 MINUTES OR LESS, SUBSEQUENT ENCOUNTER: ICD-10-CM

## 2020-10-28 PROCEDURE — 99213 OFFICE O/P EST LOW 20 MIN: CPT | Performed by: PHYSICIAN ASSISTANT

## 2020-10-28 RX ORDER — CYCLOBENZAPRINE HCL 10 MG
10 TABLET ORAL 3 TIMES DAILY PRN
Qty: 30 TABLET | Refills: 0 | Status: SHIPPED | OUTPATIENT
Start: 2020-10-28

## 2020-10-28 ASSESSMENT — PAIN SCALES - GENERAL: PAINLEVEL: SEVERE PAIN (7)

## 2020-10-28 ASSESSMENT — MIFFLIN-ST. JEOR: SCORE: 2204.75

## 2020-10-28 NOTE — PATIENT INSTRUCTIONS
Cyclobenzaprine - muscle relaxer  Take 1 tablet up to 3 times per day as needed, no driving/operating machinery on medication.     Tylenol 500 mg every 4-6 hours for the next 3 days  Ibuprofen 600-800 mg every 6-8 hours with food in stomach.     We will have you see Dr. Treviño    Stay hydrated, eat regular meals, rest.  Anything that causes a headache or worsening pain is too much.

## 2020-10-28 NOTE — LETTER
[address]  [facility] [address]  Phone: [phone]    October 28, 2020        Maco Chen   [address]  [facility] [address]          To whom it may concern:    RE: Maco Vazquezbeatris    Patient was seen and treated today at our clinic.  Please excuse from work until further assessment.     Please contact me for questions or concerns.      Sincerely,        Tab Sanchez PA-C

## 2020-11-02 ENCOUNTER — OFFICE VISIT (OUTPATIENT)
Dept: ORTHOPEDICS | Facility: OTHER | Age: 30
End: 2020-11-02

## 2020-11-02 ENCOUNTER — ANCILLARY PROCEDURE (OUTPATIENT)
Dept: GENERAL RADIOLOGY | Facility: OTHER | Age: 30
End: 2020-11-02
Attending: PHYSICAL MEDICINE & REHABILITATION
Payer: COMMERCIAL

## 2020-11-02 VITALS
BODY MASS INDEX: 34.52 KG/M2 | WEIGHT: 260.5 LBS | SYSTOLIC BLOOD PRESSURE: 130 MMHG | HEIGHT: 73 IN | DIASTOLIC BLOOD PRESSURE: 82 MMHG

## 2020-11-02 DIAGNOSIS — S06.0X1D CONCUSSION WITH LOSS OF CONSCIOUSNESS OF 30 MINUTES OR LESS, SUBSEQUENT ENCOUNTER: ICD-10-CM
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
## 2020-11-02 DIAGNOSIS — M25.512 ACUTE PAIN OF LEFT SHOULDER: ICD-10-CM

## 2020-11-02 DIAGNOSIS — M25.512 ACUTE PAIN OF LEFT SHOULDER: Primary | ICD-10-CM

## 2020-11-02 PROCEDURE — 99244 OFF/OP CNSLTJ NEW/EST MOD 40: CPT | Performed by: PHYSICAL MEDICINE & REHABILITATION

## 2020-11-02 PROCEDURE — 73030 X-RAY EXAM OF SHOULDER: CPT | Mod: LT | Performed by: RADIOLOGY

## 2020-11-02 ASSESSMENT — MIFFLIN-ST. JEOR: SCORE: 2204.78
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
## 2020-11-02 NOTE — LETTER
November 2, 2020      Maco Chen  [address]  [facility] [address]        To Whom It May Concern:    Maco Chen  was seen on 11/2/2020 for a concussion. He is unable to work until further notice. He will be seen in follow up in 2 weeks. Thank you for your help in advance.         Sincerely,        Stephany Treviño MD

## 2020-11-02 NOTE — PATIENT INSTRUCTIONS
Today's Plan of Care:  -Concussion therapies ordered: physical and occupational. Our concussion  will reach out to you to schedule.   -Avoid intense physical activity, activities with the risk of falling, or contact sports. Okay to do light walking.  -Limit screen time: computers, iPads, cell phones, video games, TV  -Rest physically and cognitively. Avoid things that worsen symptoms.  -Work: no work. Letter provided.  -No driving.   -Left shoulder x-rays ordered on the way out of clinic.  We will call with results.       -We also discussed other future treatment options:  Neuro optometry referral    Follow up: ~2 weeks. Please call with any questions or concerns.       Healing After a Concussion     Watch symptoms closely  After a concussion, you may have a headache, stomach upset, motion sickness, personality changes or feel confused or dizzy.    Each day, write down any symptoms you have along with how often it occurs, how long it lasts and what makes it better or worse. This log will help your doctor see how well you are healing.    Rest  Rest is the best treatment for a concussion. You should avoid activities that cause your symptoms to get worse or make you feel tired. This would include physical activities as well as watching TV, texting or playing video games.    Don t nap during the day. If you do nap, make sure it is for less than an hour and takes place before 3 p.m.    If you find it is hard to fall asleep, talk to your doctor.    You do not need to be awakened during the night, unless your doctor tells you otherwise.    Treating pain  It is best to avoid taking medicine, but if needed, you may take Tylenol (acetaminophen). Follow the directions on the label. If you cannot manage your pain with Tylenol, call your doctor or go to the emergency room.    Do not take other over-the-counter pain relievers (ibuprofen, Advil, Motrin, Aleve) If you find it is hard to fall asleep, talk to your  "doctor.    Do not take medicines to help you sleep (Benadryl, Tylenol PM). They may cause new problems.    Returning to activity  Doing light non-contact physical activity (walking or stationary biking) has been shown to help with recovery, as long as there is no risk of re-injury. Some tips to keep in mind:    Keep the level of exercise light so that you don t aggravate or increase your concussion symptoms.    Take your time returning to activity. A doctor can help determine the activity level that is best for you.    See a healthcare provider before returning to a sport. They can help guide you through a safe process for returning to play.    Returning to school or work  You can rest your brain by staying at home for a time. The length of time you stay away from school or work will depend on the injury and symptoms. Often it is no more than 1 to 2 full days.    Once you are back, stay away from activities that increase your symptoms. This may mean changing your routine, avoiding noise and asking for more time to complete tests and projects.    Your doctor can help you create a plan for the conditions at your job and can work with your school to help you succeed.      If you have questions, call:  During business hours  (Monday through Friday, 6:30 a.m. - 5 p.m.)  Concussion  (appointments): [phone]  After hours, weekends and holidays  Athletic Medicine hotline: [phone]          For informational purposes only.  Not to replace the advice of your health care provider.  Copyright   2014 [facility].  All rights reserved.    Clinically reviewed by the [employer]. [employer] [id number] - Rev 06/20.             Sleep Hygiene     What is it?    \"Sleep hygiene\" means having good sleep habits. Follow the tips below to sleep better at night.      Get on a schedule. Go to bed and get up at about the same time every day.    Listen to your body. Only try to sleep when you " "actually feel tired or sleepy.    Be patient. If you haven't been able to get to sleep after about 20 minutes or more, get up and do something calming or boring until you feel sleepy. Then, return to bed and try again.      Avoid caffeine (coffee, tea, cola drinks, chocolate and some medicines) for at least 4 to 6 hours before going to bed. We also suggest you don't use alcohol or nicotine (cigarettes) during this time. Both can make it harder for you to fall asleep and stay asleep.    Use your bed for sleeping only. That means no TV, computer or homework in bed!    Don't nap during the day. If you do nap, make sure it is for less than an hour and before 3 p.m.    Create sleep rituals that remind your body that it is time to sleep. Examples include breathing exercises, stretching, or reading a book.     Try a bath or shower before bed. Having a hot bath 1 to 2 hours before bedtime can help you feel sleepy.    Don't watch the clock. Checking the clock during the night can wake you up. It can also lead to negative thoughts such as \"I will never fall asleep.\"    Use a sleep diary. Track your sleep schedule to know your sleep patterns and to see where you can improve.    Get regular exercise. But try not to do heavy exercise in the 4 hours before bedtime.      Eat a healthy, balanced diet. Try eating a light, healthy snack before bed, but avoid eating a heavy meal.    Create the right sleeping area. A cool, dark, quiet room is best. If needed, try earplugs, fans and blackout curtains.      Keep your daytime routine the same even if you have a bad night sleep. Avoiding activities the next day can make it harder to sleep.          For informational purposes only. Not to replace the advice of your health care provider. Copyright   2013 [employer]. All rights reserved.    "
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
## 2022-02-23 NOTE — BRIEF OP NOTE
[facility] Orthopedic Brief Operative Note    Pre-operative diagnosis: right knee meniscus tear   Post-operative diagnosis: Same   Procedure: Procedure(s):  ARTHROSCOPY KNEE WITH MEDIAL MENISCECTOMY   Surgeon: Miguel Brody MD   Assistant(s): None   Anesthesia: Local anesthesia and LMA   Estimated blood loss: Minimal   Total IV fluids: (See anesthesia record)   Drains: None   Specimens: None   Implants: See op note   Findings:    Complications: None   Weight bearing status: Weight bearing as tolerated   Comments: See dictated operative report for full details          Bilobed Flap Text: The defect edges were debeveled with a #15 scalpel blade.  Given the location of the defect and the proximity to free margins a bilobe flap was deemed most appropriate.  Using a sterile surgical marker, an appropriate bilobe flap drawn around the defect.    The area thus outlined was incised deep to adipose tissue with a #15 scalpel blade.  The skin margins were undermined to an appropriate distance in all directions utilizing iris scissors.

## 2023-05-21 ENCOUNTER — NURSE TRIAGE (OUTPATIENT)
Dept: NURSING | Facility: CLINIC | Age: 33
End: 2023-05-21
Payer: COMMERCIAL

## 2023-05-21 NOTE — TELEPHONE ENCOUNTER
Pt calling about voice sounding hoarse, not sure if related to work, was doing a lot of yelling on Tuesday    Triage to home care, care advice given.    Germaine Hooker, RN, BSN  5/21/2023 at 11:31 AM  [facility]        Reason for Disposition    Hoarseness lasts < 2 weeks    Additional Information    Negative: SEVERE difficulty breathing (e.g., struggling for each breath, speaks in single words)    Negative: Bluish (or gray) lips or face now    Negative: [1] Stridor AND [2] difficulty breathing    Negative: Started suddenly after sting from bee, wasp, or yellow jacket    Negative: Started suddenly after taking a medicine or allergic food (e.g., nuts, seafood)    Negative: Started suddenly along with widespread hives    Negative: Tongue or facial swelling    Negative: Can't swallow normal secretions (e.g., drooling or spitting)    Negative: Sounds like a life-threatening emergency to the triager    Negative: [1] Recovered from choking episode AND [2] hoarseness lasts > 30 minutes    Negative: Direct blow to front of neck    Negative: Difficulty breathing    Negative: [1] Hoarseness starting in past 24 hours AND [2] taking an ACE Inhibitor medication (e.g., benazepril/LOTENSIN, captopril/CAPOTEN, enalapril/VASOTEC, lisinopril/ZESTRIL)    Negative: Patient sounds very sick or weak to the triager    Negative: Fever > 103 F (39.4 C)    Negative: SEVERE sore throat pain    Negative: Fever present > 3 days (72 hours)    Negative: [1] Hoarseness starting > 24 hours ago AND [2] taking an ACE Inhibitor medication (e.g., benazepril/LOTENSIN, captopril/CAPOTEN, enalapril/VASOTEC, lisinopril/ZESTRIL)    Negative: [1] MODERATE to SEVERE hoarseness (e.g., interferes with work) AND [2] professional voice user (e.g., call center worker, aldana, teacher)  (Exception: current common cold or mild URI symptoms)    Negative: [1] Hoarseness AND [2] swollen lymph node or lump in neck (Exception: current common cold or mild URI  symptoms)    Negative: [1] Hoarseness AND [2] risk factor (e.g., recent neck surgery or intubation, smoker, unexpected weight loss)  (Exception: current common cold or mild URI symptoms)    Negative: Sore throat lasts > 5 days    Negative: Hoarseness lasts > 2 weeks    Protocols used: [id number]

## (undated) DEVICE — DRSG GAUZE 4X4" TRAY

## (undated) DEVICE — BLADE KNIFE SURG 15 371115

## (undated) DEVICE — CAST PADDING 4" WEBRIL UNSTERILE

## (undated) DEVICE — CAST PADDING 4" WEBRIL STERILE

## (undated) DEVICE — BNDG ESMARK 6" STERILE

## (undated) DEVICE — GLOVE PROTEXIS W/NEU-THERA 8.5  2D73TE85

## (undated) DEVICE — DRAPE MAYO STAND 23X54 8337

## (undated) DEVICE — DRSG XEROFORM 1X8"

## (undated) DEVICE — SOL NACL 0.9% IRRIG 3000ML BAG 07972-08

## (undated) DEVICE — TUBING SUCTION 12"X1/4" N612

## (undated) DEVICE — PACK KNEE ARTHROSCOPY CUSTOM

## (undated) DEVICE — GLOVE PROTEXIS BLUE W/NEU-THERA 8.5  2D73EB85

## (undated) DEVICE — PREP CHLORAPREP 26ML TINTED ORANGE  260815

## (undated) DEVICE — KIT ARTHREX BIO-TENODESIS AR-1676DS

## (undated) DEVICE — BLADE SHAVER ARTHRO 4MM TOMCAT

## (undated) DEVICE — TUBING INFLOW & OUTFLOW INTEGRATED CROSSFLOW 0450-000-300

## (undated) DEVICE — CAST PADDING 6" WEBRIL UNSTERILE 3489

## (undated) DEVICE — PACK ARTHROSCOPY KNEE LATEX FREE SOP32CAFCN

## (undated) DEVICE — SU ETHILON 3-0 PS-2 18" 1669H

## (undated) DEVICE — BNDG ELASTIC 6" DBL LENGTH UNSTERILE 6611-16

## (undated) DEVICE — SOL ISOPROPYL ALCOHOL USP 70% 16OZ  NDC10565-002-16 D0022

## (undated) DEVICE — ADH LIQUID MASTISOL TOPICAL VIAL 2-3ML 0523-48

## (undated) DEVICE — SU FIBERWIRE 2 38"  AR-7200

## (undated) DEVICE — GLOVE PROTEXIS W/NEU-THERA 8.0  2D73TE80

## (undated) DEVICE — DRSG ABDOMINAL 07 1/2X8" 7197D

## (undated) DEVICE — TUBING ARTHRO PUMP STRYKER

## (undated) RX ORDER — LIDOCAINE HYDROCHLORIDE 10 MG/ML
INJECTION, SOLUTION EPIDURAL; INFILTRATION; INTRACAUDAL; PERINEURAL
Status: DISPENSED
Start: 2018-07-25

## (undated) RX ORDER — FENTANYL CITRATE 50 UG/ML
INJECTION, SOLUTION INTRAMUSCULAR; INTRAVENOUS
Status: DISPENSED
Start: 2018-08-08

## (undated) RX ORDER — BUPIVACAINE HYDROCHLORIDE 5 MG/ML
INJECTION, SOLUTION EPIDURAL; INTRACAUDAL
Status: DISPENSED
Start: 2018-08-08

## (undated) RX ORDER — HYDROMORPHONE HYDROCHLORIDE 1 MG/ML
INJECTION, SOLUTION INTRAMUSCULAR; INTRAVENOUS; SUBCUTANEOUS
Status: DISPENSED
Start: 2018-08-08

## (undated) RX ORDER — EPINEPHRINE 1 MG/ML
INJECTION, SOLUTION, CONCENTRATE INTRAVENOUS
Status: DISPENSED
Start: 2018-08-08

## (undated) RX ORDER — KETOROLAC TROMETHAMINE 30 MG/ML
INJECTION, SOLUTION INTRAMUSCULAR; INTRAVENOUS
Status: DISPENSED
Start: 2018-08-08